# Patient Record
Sex: FEMALE | Race: WHITE | NOT HISPANIC OR LATINO | Employment: FULL TIME | URBAN - METROPOLITAN AREA
[De-identification: names, ages, dates, MRNs, and addresses within clinical notes are randomized per-mention and may not be internally consistent; named-entity substitution may affect disease eponyms.]

---

## 2023-09-14 PROBLEM — H02.831 DERMATOCHALASIS OF RIGHT UPPER EYELID: Status: ACTIVE | Noted: 2023-09-14

## 2023-09-14 PROBLEM — B96.89 ACUTE BACTERIAL SINUSITIS: Status: ACTIVE | Noted: 2023-09-14

## 2023-09-14 PROBLEM — R92.8 ABNORMAL MAMMOGRAM: Status: ACTIVE | Noted: 2023-09-14

## 2023-09-14 PROBLEM — H57.813 BROW PTOSIS, BILATERAL: Status: ACTIVE | Noted: 2023-09-14

## 2023-09-14 PROBLEM — B97.7 HIGH RISK HPV INFECTION: Status: ACTIVE | Noted: 2023-09-14

## 2023-09-14 PROBLEM — M54.9 BACK PAIN: Status: ACTIVE | Noted: 2023-09-14

## 2023-09-14 PROBLEM — H52.4 BILATERAL PRESBYOPIA: Status: ACTIVE | Noted: 2023-09-14

## 2023-09-14 PROBLEM — J30.9 ALLERGIC RHINITIS: Status: ACTIVE | Noted: 2023-09-14

## 2023-09-14 PROBLEM — J01.90 ACUTE BACTERIAL SINUSITIS: Status: ACTIVE | Noted: 2023-09-14

## 2023-09-14 PROBLEM — R87.612 LGSIL OF CERVIX OF UNDETERMINED SIGNIFICANCE: Status: ACTIVE | Noted: 2023-09-14

## 2023-09-14 PROBLEM — R09.82 POST-NASAL DRAINAGE: Status: ACTIVE | Noted: 2023-09-14

## 2023-09-14 PROBLEM — R53.83 FATIGUE: Status: ACTIVE | Noted: 2023-09-14

## 2023-09-14 PROBLEM — H02.834 DERMATOCHALASIS OF LEFT UPPER EYELID: Status: ACTIVE | Noted: 2023-09-14

## 2023-09-14 PROBLEM — H52.03 HYPERMETROPIA OF BOTH EYES: Status: ACTIVE | Noted: 2023-09-14

## 2023-09-14 PROBLEM — M54.30 SCIATICA: Status: ACTIVE | Noted: 2023-09-14

## 2023-09-14 PROBLEM — F43.9 STRESS: Status: ACTIVE | Noted: 2023-09-14

## 2023-09-14 PROBLEM — E55.9 VITAMIN D DEFICIENCY: Status: ACTIVE | Noted: 2023-09-14

## 2023-09-14 PROBLEM — H52.203 ASTIGMATISM, BILATERAL: Status: ACTIVE | Noted: 2023-09-14

## 2023-09-15 ENCOUNTER — OFFICE VISIT (OUTPATIENT)
Dept: PRIMARY CARE | Facility: CLINIC | Age: 58
End: 2023-09-15
Payer: COMMERCIAL

## 2023-09-15 ENCOUNTER — LAB (OUTPATIENT)
Dept: LAB | Facility: LAB | Age: 58
End: 2023-09-15
Payer: COMMERCIAL

## 2023-09-15 VITALS
SYSTOLIC BLOOD PRESSURE: 120 MMHG | OXYGEN SATURATION: 96 % | BODY MASS INDEX: 31.72 KG/M2 | RESPIRATION RATE: 16 BRPM | WEIGHT: 201 LBS | HEART RATE: 75 BPM | DIASTOLIC BLOOD PRESSURE: 70 MMHG | TEMPERATURE: 97.6 F

## 2023-09-15 DIAGNOSIS — Z12.31 ENCOUNTER FOR SCREENING MAMMOGRAM FOR MALIGNANT NEOPLASM OF BREAST: ICD-10-CM

## 2023-09-15 DIAGNOSIS — Z00.00 HEALTH CARE MAINTENANCE: Primary | ICD-10-CM

## 2023-09-15 DIAGNOSIS — Z00.00 HEALTHCARE MAINTENANCE: ICD-10-CM

## 2023-09-15 DIAGNOSIS — Z12.11 COLON CANCER SCREENING: ICD-10-CM

## 2023-09-15 LAB
ALANINE AMINOTRANSFERASE (SGPT) (U/L) IN SER/PLAS: 21 U/L (ref 7–45)
ALBUMIN (G/DL) IN SER/PLAS: 4.6 G/DL (ref 3.4–5)
ALKALINE PHOSPHATASE (U/L) IN SER/PLAS: 83 U/L (ref 33–110)
ANION GAP IN SER/PLAS: 14 MMOL/L (ref 10–20)
ASPARTATE AMINOTRANSFERASE (SGOT) (U/L) IN SER/PLAS: 17 U/L (ref 9–39)
BASOPHILS (10*3/UL) IN BLOOD BY AUTOMATED COUNT: 0.04 X10E9/L (ref 0–0.1)
BASOPHILS/100 LEUKOCYTES IN BLOOD BY AUTOMATED COUNT: 0.6 % (ref 0–2)
BILIRUBIN TOTAL (MG/DL) IN SER/PLAS: 0.6 MG/DL (ref 0–1.2)
CALCIUM (MG/DL) IN SER/PLAS: 9.3 MG/DL (ref 8.6–10.3)
CARBON DIOXIDE, TOTAL (MMOL/L) IN SER/PLAS: 25 MMOL/L (ref 21–32)
CHLORIDE (MMOL/L) IN SER/PLAS: 100 MMOL/L (ref 98–107)
CHOLESTEROL (MG/DL) IN SER/PLAS: 301 MG/DL (ref 0–199)
CHOLESTEROL IN HDL (MG/DL) IN SER/PLAS: 69.8 MG/DL
CHOLESTEROL/HDL RATIO: 4.3
CREATININE (MG/DL) IN SER/PLAS: 0.72 MG/DL (ref 0.5–1.05)
EOSINOPHILS (10*3/UL) IN BLOOD BY AUTOMATED COUNT: 0.24 X10E9/L (ref 0–0.7)
EOSINOPHILS/100 LEUKOCYTES IN BLOOD BY AUTOMATED COUNT: 3.7 % (ref 0–6)
ERYTHROCYTE DISTRIBUTION WIDTH (RATIO) BY AUTOMATED COUNT: 13.6 % (ref 11.5–14.5)
ERYTHROCYTE MEAN CORPUSCULAR HEMOGLOBIN CONCENTRATION (G/DL) BY AUTOMATED: 33.5 G/DL (ref 32–36)
ERYTHROCYTE MEAN CORPUSCULAR VOLUME (FL) BY AUTOMATED COUNT: 89 FL (ref 80–100)
ERYTHROCYTES (10*6/UL) IN BLOOD BY AUTOMATED COUNT: 5.36 X10E12/L (ref 4–5.2)
GFR FEMALE: >90 ML/MIN/1.73M2
GLUCOSE (MG/DL) IN SER/PLAS: 100 MG/DL (ref 74–99)
HEMATOCRIT (%) IN BLOOD BY AUTOMATED COUNT: 47.7 % (ref 36–46)
HEMOGLOBIN (G/DL) IN BLOOD: 16 G/DL (ref 12–16)
IMMATURE GRANULOCYTES/100 LEUKOCYTES IN BLOOD BY AUTOMATED COUNT: 0.2 % (ref 0–0.9)
LDL: 212 MG/DL (ref 0–99)
LEUKOCYTES (10*3/UL) IN BLOOD BY AUTOMATED COUNT: 6.5 X10E9/L (ref 4.4–11.3)
LYMPHOCYTES (10*3/UL) IN BLOOD BY AUTOMATED COUNT: 1.59 X10E9/L (ref 1.2–4.8)
LYMPHOCYTES/100 LEUKOCYTES IN BLOOD BY AUTOMATED COUNT: 24.6 % (ref 13–44)
MONOCYTES (10*3/UL) IN BLOOD BY AUTOMATED COUNT: 0.49 X10E9/L (ref 0.1–1)
MONOCYTES/100 LEUKOCYTES IN BLOOD BY AUTOMATED COUNT: 7.6 % (ref 2–10)
NEUTROPHILS (10*3/UL) IN BLOOD BY AUTOMATED COUNT: 4.1 X10E9/L (ref 1.2–7.7)
NEUTROPHILS/100 LEUKOCYTES IN BLOOD BY AUTOMATED COUNT: 63.3 % (ref 40–80)
PLATELETS (10*3/UL) IN BLOOD AUTOMATED COUNT: 248 X10E9/L (ref 150–450)
POTASSIUM (MMOL/L) IN SER/PLAS: 4.4 MMOL/L (ref 3.5–5.3)
PROTEIN TOTAL: 7.5 G/DL (ref 6.4–8.2)
SODIUM (MMOL/L) IN SER/PLAS: 135 MMOL/L (ref 136–145)
THYROTROPIN (MIU/L) IN SER/PLAS BY DETECTION LIMIT <= 0.05 MIU/L: 1.73 MIU/L (ref 0.44–3.98)
TRIGLYCERIDE (MG/DL) IN SER/PLAS: 97 MG/DL (ref 0–149)
UREA NITROGEN (MG/DL) IN SER/PLAS: 9 MG/DL (ref 6–23)
VLDL: 19 MG/DL (ref 0–40)

## 2023-09-15 PROCEDURE — 85025 COMPLETE CBC W/AUTO DIFF WBC: CPT

## 2023-09-15 PROCEDURE — 84443 ASSAY THYROID STIM HORMONE: CPT

## 2023-09-15 PROCEDURE — 93000 ELECTROCARDIOGRAM COMPLETE: CPT | Performed by: INTERNAL MEDICINE

## 2023-09-15 PROCEDURE — 36415 COLL VENOUS BLD VENIPUNCTURE: CPT

## 2023-09-15 PROCEDURE — 99396 PREV VISIT EST AGE 40-64: CPT | Performed by: INTERNAL MEDICINE

## 2023-09-15 PROCEDURE — 1036F TOBACCO NON-USER: CPT | Performed by: INTERNAL MEDICINE

## 2023-09-15 PROCEDURE — 80061 LIPID PANEL: CPT

## 2023-09-15 PROCEDURE — 80053 COMPREHEN METABOLIC PANEL: CPT

## 2023-09-15 RX ORDER — IBUPROFEN 800 MG/1
800 TABLET ORAL 3 TIMES DAILY
COMMUNITY
Start: 2023-01-27 | End: 2024-01-08

## 2023-09-15 ASSESSMENT — ENCOUNTER SYMPTOMS
DIARRHEA: 0
CONSTIPATION: 0
SLEEP DISTURBANCE: 0
ACTIVITY CHANGE: 0
ABDOMINAL PAIN: 0

## 2023-09-15 ASSESSMENT — PATIENT HEALTH QUESTIONNAIRE - PHQ9
2. FEELING DOWN, DEPRESSED OR HOPELESS: NOT AT ALL
SUM OF ALL RESPONSES TO PHQ9 QUESTIONS 1 AND 2: 0
1. LITTLE INTEREST OR PLEASURE IN DOING THINGS: NOT AT ALL

## 2023-09-15 NOTE — PROGRESS NOTES
"Patient here for a physical     Subjective   Patient ID: Aleta Varela is a 57 y.o. female who presents for Annual Exam.    The patient reports a brief \"whooshing sound\" in her ears after she eats chocolate or coffee.  She has a history of long QT syndrome, though the EKG in the office today was unremarkable.  The patient's mother passed away at the age of 26 from long QT syndrome.    The patient continues to follow with  from Obstetrics/[facility]necology for [facility], and plans to schedule routine pap screening as she is due this year.    The patient reports good sleep quality and energy levels.  The patient denies any abdominal pain or bowel problems.     The patient is a .      Review of Systems   Constitutional:  Negative for activity change.   Gastrointestinal:  Negative for abdominal pain, constipation and diarrhea.   Psychiatric/Behavioral:  Negative for sleep disturbance.      Objective   Physical Exam  Constitutional:       Appearance: Normal appearance.   Neck:      Vascular: No carotid bruit.   Cardiovascular:      Rate and Rhythm: Normal rate and regular rhythm.      Heart sounds: Normal heart sounds.   Pulmonary:      Effort: Pulmonary effort is normal.      Breath sounds: Normal breath sounds.   Abdominal:      General: Bowel sounds are normal.      Palpations: Abdomen is soft.      Tenderness: There is no abdominal tenderness.   Skin:     General: Skin is warm and dry.   Neurological:      General: No focal deficit present.      Mental Status: She is alert and oriented to person, place, and time. Mental status is at baseline.   Psychiatric:         Mood and Affect: Mood normal.         Behavior: Behavior normal.       Assessment/Plan   Problem List Items Addressed This Visit    None  Visit Diagnoses       Health care maintenance    -  Primary    Relevant Orders    ECG 12 lead (Clinic Performed) (Completed)    Healthcare maintenance        Relevant Orders    Lipid panel    " "Comprehensive metabolic panel    CBC and Auto Differential    Tsh With Reflex To Free T4 If Abnormal    Colon cancer screening        Relevant Orders    Colonoscopy Screening    Encounter for screening mammogram for malignant neoplasm of breast        Relevant Orders    BI mammo bilateral screening tomosynthesis            CPE Performed  -  Discussed healthy diet and regular exercise.    -  Physical exam overall unremarkable. Immunizations reviewed and updated accordingly. Healthy lifestyle choices discussed (tobacco avoidance, appropriate alcohol use, avoidance of illicit substances).   -  Patient is wearing seatbelt.   -  Screening lab work ordered as indicated.    -  Age appropriate screening tests reviewed with patient.        EKG unremarkable.    IMPRESSION/PLAN:     /70 in the clinic today.  Advised the patient to call the clinic if \"whooshing sound\" occurs without caffeine trigger, and will consider additional management.     Vitamin D deficiency  - check Vitamin D levels    Diverticulitis  - Resolved. Colonoscopy previously ordered as noted below. Feeling much better.      [facility]  -  Following with  from Obstetrics/Gynecology    Health Maintenance   - Routine labs ordered including CBC, CMP, and a lipid panel to be completed in the fasting state.  Added TSH.  Last Pap 8/2018, patient will schedule repeat for 8/2023..  Last Mammogram 11/2022, ordered repeat for 2023. Re-ordered colonoscopy.     Follow-up according to regular health maintenance in year, call sooner if needed.        Scribe Attestation  By signing my name below, IMarlene Scribe   attest that this documentation has been prepared under the direction and in the presence of Matti An DO.   "

## 2023-09-22 DIAGNOSIS — E78.00 ELEVATED LDL CHOLESTEROL LEVEL: Primary | ICD-10-CM

## 2023-09-22 RX ORDER — ATORVASTATIN CALCIUM 40 MG/1
40 TABLET, FILM COATED ORAL DAILY
Qty: 30 TABLET | Refills: 11 | Status: SHIPPED | OUTPATIENT
Start: 2023-09-22 | End: 2023-10-12 | Stop reason: SDUPTHER

## 2023-10-05 ENCOUNTER — ANCILLARY PROCEDURE (OUTPATIENT)
Dept: RADIOLOGY | Facility: CLINIC | Age: 58
End: 2023-10-05
Payer: COMMERCIAL

## 2023-10-05 DIAGNOSIS — J98.11 ATELECTASIS: ICD-10-CM

## 2023-10-05 DIAGNOSIS — E78.00 ELEVATED LDL CHOLESTEROL LEVEL: ICD-10-CM

## 2023-10-05 DIAGNOSIS — R93.1 ELEVATED CORONARY ARTERY CALCIUM SCORE: Primary | ICD-10-CM

## 2023-10-05 PROCEDURE — 75571 CT HRT W/O DYE W/CA TEST: CPT

## 2023-10-05 NOTE — RESULT ENCOUNTER NOTE
Discussed with patient.  Elevated CAC at 172.  She is doing well on statin.  Will continue.  Given her family history will refer to cardiology.  Will order dedicated repeat CT of the lungs as well.  Suspect atelectasis but will get CT to confirm.

## 2023-10-11 PROBLEM — R93.1 ELEVATED CORONARY ARTERY CALCIUM SCORE: Status: ACTIVE | Noted: 2023-10-11

## 2023-10-11 NOTE — PROGRESS NOTES
Aleta Varela, 1965 was seen in cardiac consultation at the  Cardiology office October 12, 2023.      Patient was interviewed and examined.  Electronic medical records reviewed.    Patient is a pleasant 57-year-old hyperlipidemic woman with past history of prolonged QT syndrome and family history of congenital heart disease who was recently found to have positive elevated calcium score CT score of 172.  We are asked to see her at the request of her primary care physician Dr. An for cardiovascular consultation and going care.    Patient is an active realtor in the area.  She states that she has had known prolonged QT syndrome with syncopal episode 4 years ago requiring evaluation at [facility].  At that time she states that she had no significant testing except for was told that she had long QT syndrome.  She was told to watch her triggers.  She has palpitations occasionally every 3 to 6 months.  She states that the caffeine is usually her precipitator.  She has had no recurrent syncope.  She is not limited by her palpitations.  She has no other significant cardiovascular complaints.    Patient denies Chest Pain, SOB, Lightheadedness, Dizziness, TIA or CVA symptoms.  No CHF or Edema.  No GI,  or Bleeding Issues. No Recent Fever or Chills.    Cardiovascular and general review of systems is otherwise negative.    A 14-system review is otherwise negative, other than noted above.    Allergies, Medications and Vital Signs are as noted below.  NKDA.    Past Medical / Surgical History: Hyperlipidemia probable familial.  Denies hypertension or diabetes.  No prior stroke or known peripheral vascular disease.  No history of coronary artery disease, MI, valvular heart disease or rheumatic fever.  History of prolonged QT syndrome.  No pulmonary history.  History of diverticulitis.  History of endometriosis post single ovarian resection.  Allergic rhinitis.  Vitamin D deficiency.  No other  "significant past medical surgical history.  Otherwise as below.    Social History: .  Her real state agent.  Past smoker.  20-pack-year smoking history.  Occasional social alcohol.  1 cup of coffee per day.  No illicit drug use.  Otherwise as below.    Family History: Family history of mother with valvular heart disease, congenital heart disease, quotations heart upside down and quotations,  26.  Aunts with coronary artery disease a.  Son with lung cancer.  Otherwise as below.    ELECTROCARDIOGRAM: Sinus rhythm.  Nonspecific ST-T wave changes.  QTc 453.    CARDIAC TESTING:    Cardiac CT calcium score: 172, ().  Ascending aorta 34mm.    LABORATORY DATA: Chem-7, CBC, liver function studies, TSH normal except for glucose 100 and sodium 135.  Cholesterol 301, LDL cholesterol 212.  Otherwise as noted below.    All above testing was personally reviewed.    PHYSICAL EXAMINATION:     Vital signs:  /78 (BP Location: Right arm, Patient Position: Sitting, BP Cuff Size: Adult)   Ht 1.695 m (5' 6.75\")   Wt 91.9 kg (202 lb 9.6 oz)   LMP  (Exact Date)   SpO2 98%   BMI 31.97 kg/m²     General: No acute distress. Alert and oriented.  Head And Neck Examination: No jugular venous distention, no carotid bruits, no mass. Carotid upstrokes preserved. Oral mucosa moist.  No xanthelasma. Head and neck examination otherwise unremarkable.  Lungs: Clear to auscultation and percussion. No wheezes, no rales, and no rhonchi.  Chest: Excursion appeared to be normal. No chest wall tenderness on palpation.  Heart: Normal S1 and S2. No S3. No S4. No rub. Grade 1/6 systolic murmur, best heard at the left sternal border. Point of maximal impulse was within normal limits.  Abdomen: Soft. Nontender. No organomegaly. No bruits. No masses.Obese.  Extremities: No bipedal edema. No clubbing. No cyanosis. Pulses are strong throughout. No bruits.  Musculoskeletal Exam: No ulcers, otherwise unremarkable.  Neuro: Neurologically " appeared grossly intact.    IMPRESSION:     Palpitations  Syncopal episode past, no recurrence.  Abnormal rest electrocardiogram  History of long QT syndrome  Elevated calcium score, 172, 9/2023  Hyperlipidemia, probable familial  Elevated glucose  Vitamin D Deficiency  Diverticulosis  Chronic lower back pain  Past tobacco abuse  Family history of congenital heart disease and coronary artery disease   Obesity  Otherwise as per assessment below.    RECOMMENDATIONS:     Patient has above-noted history and findings of.  Here she appears to be doing well overall.  Given her positive calcium score would suggest the following testing: Exercise Myoview perfusion stress test, echocardiogram, 48-hour Holter monitoring.  Further recommendations including possible invasive testing may be warranted.    In terms of her long QT syndrome, she appears to be handling this well we will address as warranted following her monitoring.    We will suggest adding Aspirin 81 mg daily to her regimen.  She will continue her Lipitor 40 mg daily which was just started.  Cholesterol goal of LDL 70 and more ideally closer to 40 given her hyperglycemia and strong family history of CAD.    MyChart portal was encouraged.    We will plan to see back in one month with the above testing, laboratory Studies and ECG as noted below.     Patient will follow up with their primary physician for general care.    The patient knows to contact medical care earlier if need be.      Ventura Norwood MD, [facility] /  Cardiology      Of Note:  [employer] voice recognition dictation software was utilized partially in the preparation of this note, therefore, inaccuracies in spelling, word choice and punctuation may have occurred which were not recognized the time of signing.    `````    Past Medical History:  She has a past medical history of Allergic rhinitis, unspecified (11/13/2020) and Personal history of other specified conditions.    Past Surgical  "History:  She has a past surgical history that includes Other surgical history (04/17/2014).      Social History:  She reports that she has never smoked. She has never used smokeless tobacco. She reports current alcohol use. She reports that she does not use drugs.    Family History:  Family History   Problem Relation Name Age of Onset    Other (cardiac disorder) Mother      Lung cancer Father      Drug abuse Son      Cancer Other          Allergies:  Patient has no known allergies.    Outpatient Medications:  Current Outpatient Medications   Medication Instructions    atorvastatin (LIPITOR) 40 mg, oral, Daily    ibuprofen 800 mg, oral, 3 times daily        Last Recorded Vitals:  Vitals:    10/12/23 0930   BP: 128/78   BP Location: Right arm   Patient Position: Sitting   BP Cuff Size: Adult   SpO2: 98%   Weight: 91.9 kg (202 lb 9.6 oz)   Height: 1.695 m (5' 6.75\")       Last Labs:  CBC -  Lab Results   Component Value Date    WBC 6.5 09/15/2023    HGB 16.0 09/15/2023    HCT 47.7 (H) 09/15/2023    MCV 89 09/15/2023     09/15/2023       CMP -  Lab Results   Component Value Date    CALCIUM 9.3 09/15/2023    PROT 7.5 09/15/2023    ALBUMIN 4.6 09/15/2023    AST 17 09/15/2023    ALT 21 09/15/2023    ALKPHOS 83 09/15/2023    BILITOT 0.6 09/15/2023       LIPID PANEL -   Lab Results   Component Value Date    CHOL 301 (H) 09/15/2023    TRIG 97 09/15/2023    HDL 69.8 09/15/2023    CHHDL 4.3 09/15/2023    LDLF 212 (H) 09/15/2023    VLDL 19 09/15/2023       RENAL FUNCTION PANEL -   Lab Results   Component Value Date    GLUCOSE 100 (H) 09/15/2023     (L) 09/15/2023    K 4.4 09/15/2023     09/15/2023    CO2 25 09/15/2023    ANIONGAP 14 09/15/2023    BUN 9 09/15/2023    CREATININE 0.72 09/15/2023    CALCIUM 9.3 09/15/2023    ALBUMIN 4.6 09/15/2023            Ventura Norwood MD [facility] Cardiology    "

## 2023-10-12 ENCOUNTER — OFFICE VISIT (OUTPATIENT)
Dept: CARDIOLOGY | Facility: CLINIC | Age: 58
End: 2023-10-12
Payer: COMMERCIAL

## 2023-10-12 VITALS
DIASTOLIC BLOOD PRESSURE: 78 MMHG | SYSTOLIC BLOOD PRESSURE: 128 MMHG | WEIGHT: 202.6 LBS | BODY MASS INDEX: 31.8 KG/M2 | HEIGHT: 67 IN | OXYGEN SATURATION: 98 %

## 2023-10-12 DIAGNOSIS — R53.83 FATIGUE, UNSPECIFIED TYPE: ICD-10-CM

## 2023-10-12 DIAGNOSIS — Z87.891 FORMER SMOKER: ICD-10-CM

## 2023-10-12 DIAGNOSIS — E78.00 ELEVATED LDL CHOLESTEROL LEVEL: ICD-10-CM

## 2023-10-12 DIAGNOSIS — M54.50 CHRONIC LOW BACK PAIN, UNSPECIFIED BACK PAIN LATERALITY, UNSPECIFIED WHETHER SCIATICA PRESENT: ICD-10-CM

## 2023-10-12 DIAGNOSIS — E55.9 VITAMIN D DEFICIENCY: Primary | ICD-10-CM

## 2023-10-12 DIAGNOSIS — I45.81 LONG Q-T SYNDROME: ICD-10-CM

## 2023-10-12 DIAGNOSIS — R94.31 ABNORMAL EKG: ICD-10-CM

## 2023-10-12 DIAGNOSIS — K57.92 DIVERTICULITIS: ICD-10-CM

## 2023-10-12 DIAGNOSIS — E78.2 MIXED HYPERLIPIDEMIA: ICD-10-CM

## 2023-10-12 DIAGNOSIS — G89.29 CHRONIC LOW BACK PAIN, UNSPECIFIED BACK PAIN LATERALITY, UNSPECIFIED WHETHER SCIATICA PRESENT: ICD-10-CM

## 2023-10-12 DIAGNOSIS — R93.1 ELEVATED CORONARY ARTERY CALCIUM SCORE: ICD-10-CM

## 2023-10-12 DIAGNOSIS — R73.9 HYPERGLYCEMIA: ICD-10-CM

## 2023-10-12 DIAGNOSIS — Z82.79 FAMILY HISTORY OF CONGENITAL HEART DISEASE: ICD-10-CM

## 2023-10-12 PROCEDURE — 99205 OFFICE O/P NEW HI 60 MIN: CPT | Performed by: INTERNAL MEDICINE

## 2023-10-12 PROCEDURE — 93000 ELECTROCARDIOGRAM COMPLETE: CPT | Performed by: INTERNAL MEDICINE

## 2023-10-12 PROCEDURE — 1036F TOBACCO NON-USER: CPT | Performed by: INTERNAL MEDICINE

## 2023-10-12 RX ORDER — ASPIRIN 81 MG/1
81 TABLET ORAL DAILY
Qty: 90 TABLET | Refills: 3 | Status: SHIPPED | OUTPATIENT
Start: 2023-10-12 | End: 2024-10-11

## 2023-10-12 RX ORDER — ATORVASTATIN CALCIUM 40 MG/1
40 TABLET, FILM COATED ORAL DAILY
Qty: 30 TABLET | Refills: 11 | Status: SHIPPED | OUTPATIENT
Start: 2023-10-12 | End: 2024-10-11

## 2023-10-12 ASSESSMENT — PAIN SCALES - GENERAL: PAINLEVEL: 0-NO PAIN

## 2023-10-17 ENCOUNTER — ANESTHESIA EVENT (OUTPATIENT)
Dept: GASTROENTEROLOGY | Facility: EXTERNAL LOCATION | Age: 58
End: 2023-10-17

## 2023-10-19 ENCOUNTER — APPOINTMENT (OUTPATIENT)
Dept: RADIOLOGY | Facility: CLINIC | Age: 58
End: 2023-10-19
Payer: COMMERCIAL

## 2023-10-19 ENCOUNTER — ANCILLARY PROCEDURE (OUTPATIENT)
Dept: RADIOLOGY | Facility: CLINIC | Age: 58
End: 2023-10-19
Payer: COMMERCIAL

## 2023-10-19 DIAGNOSIS — J98.11 ATELECTASIS: ICD-10-CM

## 2023-10-19 PROCEDURE — 71250 CT THORAX DX C-: CPT

## 2023-10-19 PROCEDURE — 71250 CT THORAX DX C-: CPT | Performed by: RADIOLOGY

## 2023-10-21 DIAGNOSIS — Z87.891 FORMER SMOKER: Primary | ICD-10-CM

## 2023-10-26 ENCOUNTER — ANESTHESIA (OUTPATIENT)
Dept: GASTROENTEROLOGY | Facility: EXTERNAL LOCATION | Age: 58
End: 2023-10-26

## 2023-10-26 ENCOUNTER — APPOINTMENT (OUTPATIENT)
Dept: GASTROENTEROLOGY | Facility: EXTERNAL LOCATION | Age: 58
End: 2023-10-26
Payer: COMMERCIAL

## 2023-10-27 ENCOUNTER — HOSPITAL ENCOUNTER (EMERGENCY)
Facility: HOSPITAL | Age: 58
Discharge: HOME | End: 2023-10-27
Attending: EMERGENCY MEDICINE
Payer: COMMERCIAL

## 2023-10-27 VITALS
WEIGHT: 200.62 LBS | SYSTOLIC BLOOD PRESSURE: 167 MMHG | DIASTOLIC BLOOD PRESSURE: 74 MMHG | BODY MASS INDEX: 31.66 KG/M2 | TEMPERATURE: 97.5 F | OXYGEN SATURATION: 97 % | HEART RATE: 86 BPM | RESPIRATION RATE: 18 BRPM

## 2023-10-27 DIAGNOSIS — J01.90 ACUTE NON-RECURRENT SINUSITIS, UNSPECIFIED LOCATION: Primary | ICD-10-CM

## 2023-10-27 PROCEDURE — 99284 EMERGENCY DEPT VISIT MOD MDM: CPT | Performed by: EMERGENCY MEDICINE

## 2023-10-27 PROCEDURE — 99283 EMERGENCY DEPT VISIT LOW MDM: CPT | Performed by: EMERGENCY MEDICINE

## 2023-10-27 RX ORDER — AMOXICILLIN AND CLAVULANATE POTASSIUM 875; 125 MG/1; MG/1
1 TABLET, FILM COATED ORAL EVERY 12 HOURS
Qty: 14 TABLET | Refills: 0 | Status: SHIPPED | OUTPATIENT
Start: 2023-10-27 | End: 2023-11-03

## 2023-10-27 ASSESSMENT — PAIN DESCRIPTION - PAIN TYPE: TYPE: ACUTE PAIN

## 2023-10-27 ASSESSMENT — PAIN DESCRIPTION - PROGRESSION: CLINICAL_PROGRESSION: NOT CHANGED

## 2023-10-27 ASSESSMENT — PAIN SCALES - GENERAL: PAINLEVEL_OUTOF10: 6

## 2023-10-27 ASSESSMENT — LIFESTYLE VARIABLES
REASON UNABLE TO ASSESS: NO
HAVE PEOPLE ANNOYED YOU BY CRITICIZING YOUR DRINKING: NO
EVER HAD A DRINK FIRST THING IN THE MORNING TO STEADY YOUR NERVES TO GET RID OF A HANGOVER: NO
EVER FELT BAD OR GUILTY ABOUT YOUR DRINKING: NO
HAVE YOU EVER FELT YOU SHOULD CUT DOWN ON YOUR DRINKING: NO

## 2023-10-27 ASSESSMENT — PAIN DESCRIPTION - ONSET: ONSET: ONGOING

## 2023-10-27 ASSESSMENT — PAIN - FUNCTIONAL ASSESSMENT: PAIN_FUNCTIONAL_ASSESSMENT: 0-10

## 2023-10-27 ASSESSMENT — PAIN DESCRIPTION - LOCATION: LOCATION: THROAT

## 2023-10-27 ASSESSMENT — COLUMBIA-SUICIDE SEVERITY RATING SCALE - C-SSRS
6. HAVE YOU EVER DONE ANYTHING, STARTED TO DO ANYTHING, OR PREPARED TO DO ANYTHING TO END YOUR LIFE?: NO
2. HAVE YOU ACTUALLY HAD ANY THOUGHTS OF KILLING YOURSELF?: NO
1. IN THE PAST MONTH, HAVE YOU WISHED YOU WERE DEAD OR WISHED YOU COULD GO TO SLEEP AND NOT WAKE UP?: NO

## 2023-10-27 ASSESSMENT — PAIN DESCRIPTION - DESCRIPTORS: DESCRIPTORS: BURNING;SHARP

## 2023-10-27 ASSESSMENT — PAIN DESCRIPTION - FREQUENCY: FREQUENCY: CONSTANT/CONTINUOUS

## 2023-10-27 NOTE — ED TRIAGE NOTES
Pt states sinus pressure, cough started 10 days ago. Pt states sore throat started four days ago. Pt states nauseated with vomiting. Pt states fever started Tuesday.

## 2023-10-27 NOTE — ED PROVIDER NOTES
"EMERGENCY DEPARTMENT ENCOUNTER      Pt Name: Aleta Varela  MRN: [id number]  Birthdate 1965  Date of evaluation: 10/27/2023  Provider: Duarte Freeman DO    CHIEF COMPLAINT       Chief Complaint   Patient presents with    Sinusitis    Sore Throat    Cough    Vomiting         HISTORY OF PRESENT ILLNESS    57-year-old female with hyperlipidemia comes to the emergency room today for URI symptoms.  Onset was 10 days ago with cough, congestion, rhinorrhea.  Over the last few days she developed a fever, increased phlegm production, sore throat as well.  The fever subjective at home she is felt warm has not checked her temperature but has not swabbed result for COVID or flu.  No chest pain or shortness of breath.  She says she was having \"shortness of breath\" which was actually more feeling like her throat was congested as opposed to shortness of breath from her lungs.  No abdominal pain, nausea, vomiting or frequency, urgency, hematuria, dysuria.  No black or red stools.  No other symptoms today.  She does not smoke anymore, no regular drug or alcohol use.      History provided by:  Patient      Nursing Notes were reviewed.    PAST MEDICAL HISTORY     Past Medical History:   Diagnosis Date    Allergic rhinitis, unspecified 11/13/2020    Allergic rhinitis    Personal history of other specified conditions     History of prolonged Q-T interval on ECG         SURGICAL HISTORY       Past Surgical History:   Procedure Laterality Date    OTHER SURGICAL HISTORY  04/17/2014    Ovarian Cystectomy         CURRENT MEDICATIONS       Previous Medications    ASPIRIN 81 MG EC TABLET    Take 1 tablet (81 mg) by mouth once daily.    ATORVASTATIN (LIPITOR) 40 MG TABLET    Take 1 tablet (40 mg) by mouth once daily.    IBUPROFEN 800 MG TABLET    Take 1 tablet (800 mg) by mouth 3 times a day.       ALLERGIES     Patient has no known allergies.    FAMILY HISTORY       Family History   Problem Relation Name Age of Onset    Other (cardiac " disorder) Mother      Lung cancer Father      Drug abuse Son      Cancer Other            SOCIAL HISTORY       Social History     Socioeconomic History    Marital status:      Spouse name: None    Number of children: None    Years of education: None    Highest education level: None   Occupational History     Employer: [employer]   Tobacco Use    Smoking status: Never    Smokeless tobacco: Never   Vaping Use    Vaping Use: Never used   Substance and Sexual Activity    Alcohol use: Yes    Drug use: Never    Sexual activity: None   Other Topics Concern    None   Social History Narrative    None     Social Determinants of Health     Financial Resource Strain: Not on file   Food Insecurity: Not on file   Transportation Needs: Not on file   Physical Activity: Not on file   Stress: Not on file   Social Connections: Not on file   Intimate Partner Violence: Not on file   Housing Stability: Not on file       SCREENINGS                        PHYSICAL EXAM    (up to 7 for level 4, 8 or more for level 5)     ED Triage Vitals [10/27/23 0732]   Temp Heart Rate Resp BP   36.4 °C (97.5 °F) 83 18 171/77      SpO2 Temp Source Heart Rate Source Patient Position   97 % Temporal Monitor Sitting      BP Location FiO2 (%)     Right arm --       Physical Exam  Vitals and nursing note reviewed.   Constitutional:       General: She is not in acute distress.  HENT:      Head: Normocephalic and atraumatic.   Eyes:      General: No scleral icterus.        Right eye: No discharge.         Left eye: No discharge.      Conjunctiva/sclera: Conjunctivae normal.   Cardiovascular:      Rate and Rhythm: Normal rate and regular rhythm.      Pulses: Normal pulses.   Pulmonary:      Effort: Pulmonary effort is normal.   Abdominal:      General: Abdomen is flat.      Palpations: Abdomen is soft.      Tenderness: There is no abdominal tenderness. There is no guarding or rebound.   Musculoskeletal:         General: No deformity.      Right lower  leg: No edema.      Left lower leg: No edema.   Skin:     General: Skin is warm and dry.   Neurological:      Mental Status: She is alert and oriented to person, place, and time. Mental status is at baseline.   Psychiatric:         Mood and Affect: Mood normal.         Behavior: Behavior normal.          DIAGNOSTIC RESULTS     LABS:  Labs Reviewed - No data to display    All other labs were within normal range or not returned as of this dictation.    Imaging  No orders to display        Procedures  Procedures     EMERGENCY DEPARTMENT COURSE/MDM:     Diagnoses as of 10/27/23 0800   Acute non-recurrent sinusitis, unspecified location        Medical Decision Making  57-year-old female comes to the emergency room with URI symptoms.  She does fit the profile for sinus infection with symptoms lasting about 10 days, fevers developing later in the symptom course.  Patient ENT exam is unremarkable except for some tonsillar area FEMA.  No exudate.  No facial asymmetry, abscess formation inside the mouth.  No chest pain or shortness of breath, normal vital signs at this time.  We did offer her basic blood work, chest x-ray, however she declined and was okay with being discharged home with a prescription for Augmentin.  She will follow-up with her PCP, return for new, concerning or worsening symptoms.        Patient and or family in agreement and understanding of treatment plan.  All questions answered.      I reviewed the case with the attending ED physician. The attending ED physician agrees with the plan. Patient and/or patient´s representative was counseled regarding labs, imaging, likely diagnosis, and plan. All questions were answered.    ED Medications administered this visit:  Medications - No data to display    New Prescriptions from this visit:    New Prescriptions    No medications on file       Follow-up:  Matti An, DO  [address]  [facility]  [facility]  [address]  [phone]              Final Impression:   1. Acute non-recurrent sinusitis, unspecified location          (Please note that portions of this note were completed with a voice recognition program.  Efforts were made to edit the dictations but occasionally words are mis-transcribed.)     Duarte Freeman DO  Resident  10/27/23 0800

## 2023-11-01 ENCOUNTER — OFFICE VISIT (OUTPATIENT)
Dept: OBSTETRICS AND GYNECOLOGY | Facility: CLINIC | Age: 58
End: 2023-11-01
Payer: COMMERCIAL

## 2023-11-08 ENCOUNTER — APPOINTMENT (OUTPATIENT)
Dept: RADIOLOGY | Facility: CLINIC | Age: 58
End: 2023-11-08
Payer: COMMERCIAL

## 2023-11-09 ENCOUNTER — APPOINTMENT (OUTPATIENT)
Dept: CARDIOLOGY | Facility: CLINIC | Age: 58
End: 2023-11-09
Payer: COMMERCIAL

## 2023-11-10 ENCOUNTER — APPOINTMENT (OUTPATIENT)
Dept: RADIOLOGY | Facility: CLINIC | Age: 58
End: 2023-11-10
Payer: COMMERCIAL

## 2023-11-10 ENCOUNTER — APPOINTMENT (OUTPATIENT)
Dept: CARDIOLOGY | Facility: CLINIC | Age: 58
End: 2023-11-10
Payer: COMMERCIAL

## 2023-11-13 ENCOUNTER — APPOINTMENT (OUTPATIENT)
Dept: RADIOLOGY | Facility: CLINIC | Age: 58
End: 2023-11-13
Payer: COMMERCIAL

## 2023-11-13 ENCOUNTER — ANCILLARY PROCEDURE (OUTPATIENT)
Dept: RADIOLOGY | Facility: CLINIC | Age: 58
End: 2023-11-13
Payer: COMMERCIAL

## 2023-11-13 DIAGNOSIS — Z12.31 ENCOUNTER FOR SCREENING MAMMOGRAM FOR MALIGNANT NEOPLASM OF BREAST: ICD-10-CM

## 2023-11-13 PROCEDURE — 77063 BREAST TOMOSYNTHESIS BI: CPT

## 2023-11-13 PROCEDURE — 77067 SCR MAMMO BI INCL CAD: CPT | Mod: BILATERAL PROCEDURE | Performed by: RADIOLOGY

## 2023-11-13 PROCEDURE — 77063 BREAST TOMOSYNTHESIS BI: CPT | Mod: BILATERAL PROCEDURE | Performed by: RADIOLOGY

## 2023-11-15 ENCOUNTER — APPOINTMENT (OUTPATIENT)
Dept: RADIOLOGY | Facility: CLINIC | Age: 58
End: 2023-11-15
Payer: COMMERCIAL

## 2023-11-16 ENCOUNTER — APPOINTMENT (OUTPATIENT)
Dept: GASTROENTEROLOGY | Facility: EXTERNAL LOCATION | Age: 58
End: 2023-11-16
Payer: COMMERCIAL

## 2023-11-16 ENCOUNTER — APPOINTMENT (OUTPATIENT)
Dept: CARDIOLOGY | Facility: CLINIC | Age: 58
End: 2023-11-16
Payer: COMMERCIAL

## 2023-12-07 ENCOUNTER — OFFICE VISIT (OUTPATIENT)
Dept: PRIMARY CARE | Facility: CLINIC | Age: 58
End: 2023-12-07
Payer: COMMERCIAL

## 2023-12-07 ENCOUNTER — APPOINTMENT (OUTPATIENT)
Dept: GASTROENTEROLOGY | Facility: EXTERNAL LOCATION | Age: 58
End: 2023-12-07
Payer: COMMERCIAL

## 2023-12-07 VITALS
OXYGEN SATURATION: 98 % | TEMPERATURE: 97.4 F | WEIGHT: 197 LBS | DIASTOLIC BLOOD PRESSURE: 85 MMHG | HEART RATE: 85 BPM | SYSTOLIC BLOOD PRESSURE: 153 MMHG | BODY MASS INDEX: 31.09 KG/M2 | RESPIRATION RATE: 16 BRPM

## 2023-12-07 DIAGNOSIS — R05.9 COUGH, UNSPECIFIED TYPE: Primary | ICD-10-CM

## 2023-12-07 PROCEDURE — 99214 OFFICE O/P EST MOD 30 MIN: CPT | Performed by: INTERNAL MEDICINE

## 2023-12-07 PROCEDURE — 1036F TOBACCO NON-USER: CPT | Performed by: INTERNAL MEDICINE

## 2023-12-07 RX ORDER — ASCORBIC ACID 1000 MG
175 TABLET ORAL DAILY
COMMUNITY

## 2023-12-07 RX ORDER — CHOLECALCIFEROL (VITAMIN D3) 50 MCG
50 TABLET ORAL DAILY
COMMUNITY

## 2023-12-07 RX ORDER — METHYLPREDNISOLONE 4 MG/1
TABLET ORAL
Qty: 21 TABLET | Refills: 0 | Status: SHIPPED | OUTPATIENT
Start: 2023-12-07 | End: 2023-12-14

## 2023-12-07 RX ORDER — BENZONATATE 100 MG/1
100 CAPSULE ORAL 3 TIMES DAILY PRN
Qty: 42 CAPSULE | Refills: 1 | Status: SHIPPED | OUTPATIENT
Start: 2023-12-07 | End: 2024-01-06

## 2023-12-07 ASSESSMENT — PATIENT HEALTH QUESTIONNAIRE - PHQ9
1. LITTLE INTEREST OR PLEASURE IN DOING THINGS: NOT AT ALL
2. FEELING DOWN, DEPRESSED OR HOPELESS: NOT AT ALL
SUM OF ALL RESPONSES TO PHQ9 QUESTIONS 1 AND 2: 0

## 2023-12-07 ASSESSMENT — ENCOUNTER SYMPTOMS
CONSTIPATION: 1
COUGH: 1
SHORTNESS OF BREATH: 0
BLOOD IN STOOL: 0

## 2023-12-07 NOTE — PROGRESS NOTES
Patient here for a GI issue / cough and fatigue     Subjective   Patient ID: Aleta Varela is a 57 y.o. female who presents for GI Problem and Cough.    The patient reports occasional constipation that was recently relieved with Dulcolax.  She notes mild abdominal tenderness at a site treated by cool sculpting.  She denies any hematochezia or melena.    The patient notes ongoing dry cough since 10/2023 despite attending the ER for acute sinusitis at the time and completing Augmentin.  She has noticed that the symptoms improve with a hot shower, but denies any dyspnea or nasal congestion.  The patient is unsure whether she has taken prednisone in the past.  She had several coworkers with similar symptoms.      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    GI Problem    The illness is also significant for constipation.   Cough  Pertinent negatives include no shortness of breath.       Review of Systems   HENT:  Negative for congestion.    Respiratory:  Positive for cough. Negative for shortness of breath.    Gastrointestinal:  Positive for constipation. Negative for blood in stool.       Objective   Physical Exam  Constitutional:       Appearance: Normal appearance.   Neck:      Vascular: No carotid bruit.   Cardiovascular:      Rate and Rhythm: Normal rate and regular rhythm.      Heart sounds: Normal heart sounds.   Pulmonary:      Effort: Pulmonary effort is normal.      Breath sounds: Normal breath sounds.   Abdominal:      General: Bowel sounds are normal.      Palpations: Abdomen is soft.      Tenderness: There is no abdominal tenderness.   Skin:     General: Skin is warm and dry.   Neurological:      General: No focal deficit present.      Mental Status: She is alert and oriented to person, place, and time. Mental status is at baseline.   Psychiatric:         Mood and Affect: Mood normal.         Behavior: Behavior normal.         Assessment/Plan   Problem List Items Addressed This  Visit    None  Visit Diagnoses         Codes    Cough, unspecified type    -  Primary R05.9    Relevant Medications    methylPREDNISolone (Medrol Dospak) 4 mg tablets    benzonatate (Tessalon) 100 mg capsule            IMPRESSION/PLAN:     Recurrent Constipation  - Advised patient to try Miralax at first, and then continue with Metamucil.    Cough  - Likely recovering from previous RSV.  Prescribed benzonatate (Tessalon) 100 mg up to TID prn and methylprednisolone 4mg Dospak to be taken as directed on the packaging.  Call the clinic if symptoms persist or worsen.     HLD  - Cholesterol and LDL high per 9/2023.  Continue with atorvastatin 40mg every day and ASA 81mg once daily.  ASCVD 3.8% 12/2023.  CT Calcium Score 172 per 10/2023.  Following with  from Cardiology.    Diverticulitis     -Colonoscopy previously ordered as    noted below. Feeling much better.      [facility]  -  Following with  from Obstetrics/Gynecology     Vitamin D deficiency  - check Vitamin D levels    Health Maintenance   - Routine labs 9/2023.  Last Pap 8/2018.  Last Mammogram 11/2023. Re-ordered colonoscopy.     Follow-up according to regular health maintenance in year, call sooner if needed.        Scribe Attestation  By signing my name below, I, Marlene Mccann, Danie   attest that this documentation has been prepared under the direction and in the presence of Matti An DO.     Marlene Mccann 12/07/23 9:59 AM

## 2023-12-08 ENCOUNTER — OFFICE VISIT (OUTPATIENT)
Dept: OBSTETRICS AND GYNECOLOGY | Facility: CLINIC | Age: 58
End: 2023-12-08
Payer: COMMERCIAL

## 2023-12-08 VITALS
WEIGHT: 195 LBS | BODY MASS INDEX: 29.55 KG/M2 | SYSTOLIC BLOOD PRESSURE: 120 MMHG | HEIGHT: 68 IN | DIASTOLIC BLOOD PRESSURE: 78 MMHG

## 2023-12-08 DIAGNOSIS — N95.2 VAGINAL ATROPHY: ICD-10-CM

## 2023-12-08 DIAGNOSIS — Z12.4 CERVICAL CANCER SCREENING: ICD-10-CM

## 2023-12-08 DIAGNOSIS — Z01.419 WELL WOMAN EXAM WITH ROUTINE GYNECOLOGICAL EXAM: Primary | ICD-10-CM

## 2023-12-08 PROCEDURE — 99386 PREV VISIT NEW AGE 40-64: CPT

## 2023-12-08 PROCEDURE — 1036F TOBACCO NON-USER: CPT

## 2023-12-08 PROCEDURE — 87624 HPV HI-RISK TYP POOLED RSLT: CPT

## 2023-12-08 PROCEDURE — 88175 CYTOPATH C/V AUTO FLUID REDO: CPT

## 2023-12-08 RX ORDER — ESTRADIOL 0.1 MG/G
2 CREAM VAGINAL NIGHTLY
Qty: 42.5 G | Refills: 6 | Status: SHIPPED | OUTPATIENT
Start: 2023-12-08 | End: 2024-12-07

## 2023-12-08 ASSESSMENT — PAIN SCALES - GENERAL: PAINLEVEL: 0-NO PAIN

## 2023-12-08 NOTE — PROGRESS NOTES
"Assessment/Plan   Diagnoses and all orders for this visit:  Well woman exam with routine gynecological exam  Vaginal atrophy  -     estradiol (Estrace) 0.01 % (0.1 mg/gram) vaginal cream; Insert 0.5 Applicatorfuls (2 g) into the vagina once daily at bedtime. At bedtime for 2 weeks, then at bedtime twice a week.  Cervical cancer screening  -     THINPREP PAP TEST      Stephanie ZAVALA Juancarlos, APRN-CNM     Subjective   Aleta Varela is a 57 y.o. female who is here for a routine exam.     Concerns today:  None today     No LMP recorded. Patient is postmenopausal.    Denies hot flashes, occasional night sweats. Denies mood swings. Occasional trouble sleeping.     Sexual Activity: sexually active, male partners; Patient reports 1 partners in the last 12 months.  Pain with intercourse? Yes  and very occasional   Loss of desire? Yes   Able to have an orgasm? Yes     History of prior STI:  many years ago       Last pap: 2018  History of abnormal Pap smear: yes - many years ago  Family history of uterine or ovarian cancer: no    Last mammogram:   History of abnormal mammogram: yes - follow up has always been neg   Family history of breast cancer: no  Menstrual History:  OB History          3    Para   2    Term   2            AB   1    Living   2         SAB        IAB        Ectopic        Multiple        Live Births   2                  No LMP recorded. Patient is postmenopausal.       Objective   /78   Ht 1.727 m (5' 8\")   Wt 88.5 kg (195 lb)   BMI 29.65 kg/m²   Physical Exam  Constitutional:       General: She is not in acute distress.     Appearance: Normal appearance. She is normal weight. She is not ill-appearing.   Genitourinary:      Vulva, bladder, rectum and urethral meatus normal.      No lesions in the vagina.      Right Labia: No rash, tenderness, lesions, skin changes or Bartholin's cyst.     Left Labia: No tenderness, lesions, skin changes, Bartholin's cyst or rash.     No labial fusion " noted.      No vaginal discharge, erythema, tenderness, bleeding or ulceration.      Mild vaginal atrophy present.       Right Adnexa: not tender and no mass present.     Left Adnexa: not tender and no mass present.     No cervical discharge, lesion or polyp.      Uterus is not enlarged, tender, irregular or prolapsed.      No uterine mass detected.     No urethral prolapse, mass or discharge present.      Pelvic exam was performed with patient in the lithotomy position.   Breasts:     Breasts are symmetrical.      Breasts are soft.     Right: Normal.      Left: Normal.   HENT:      Head: Normocephalic.      Right Ear: External ear normal.      Left Ear: External ear normal.      Nose: Nose normal.      Mouth/Throat:      Mouth: Mucous membranes are moist.      Pharynx: Oropharynx is clear.   Eyes:      General:         Right eye: No discharge.         Left eye: No discharge.      Pupils: Pupils are equal, round, and reactive to light.   Pulmonary:      Effort: Pulmonary effort is normal. No respiratory distress.   Abdominal:      General: Abdomen is flat.      Palpations: Abdomen is soft.      Tenderness: There is no abdominal tenderness.   Musculoskeletal:         General: No swelling, tenderness, deformity or signs of injury. Normal range of motion.      Cervical back: Normal range of motion. No rigidity or tenderness.   Neurological:      General: No focal deficit present.      Mental Status: She is alert and oriented to person, place, and time. Mental status is at baseline.      Sensory: No sensory deficit.      Motor: No weakness.   Skin:     General: Skin is warm and dry.      Coloration: Skin is not jaundiced.      Findings: No bruising, erythema, lesion or rash.   Psychiatric:         Mood and Affect: Mood normal.         Behavior: Behavior normal.         Thought Content: Thought content normal.         Judgment: Judgment normal.

## 2024-01-02 LAB
CYTOLOGY CMNT CVX/VAG CYTO-IMP: NORMAL
HPV HR 12 DNA GENITAL QL NAA+PROBE: NEGATIVE
HPV HR GENOTYPES PNL CVX NAA+PROBE: NEGATIVE
HPV16 DNA SPEC QL NAA+PROBE: NEGATIVE
HPV18 DNA SPEC QL NAA+PROBE: NEGATIVE
LAB AP HPV GENOTYPE QUESTION: YES
LAB AP HPV HR: NORMAL
LABORATORY COMMENT REPORT: NORMAL
PATH REPORT.TOTAL CANCER: NORMAL

## 2024-01-08 DIAGNOSIS — M54.9 BACK PAIN, UNSPECIFIED BACK LOCATION, UNSPECIFIED BACK PAIN LATERALITY, UNSPECIFIED CHRONICITY: Primary | ICD-10-CM

## 2024-01-08 RX ORDER — IBUPROFEN 800 MG/1
TABLET ORAL
Qty: 90 TABLET | Refills: 0 | Status: SHIPPED | OUTPATIENT
Start: 2024-01-08 | End: 2024-05-18 | Stop reason: SDUPTHER

## 2024-02-05 DIAGNOSIS — B37.9 YEAST INFECTION: Primary | ICD-10-CM

## 2024-02-05 RX ORDER — FLUCONAZOLE 150 MG/1
150 TABLET ORAL ONCE
Qty: 1 TABLET | Refills: 0 | Status: SHIPPED | OUTPATIENT
Start: 2024-02-05 | End: 2024-02-05

## 2024-02-07 ENCOUNTER — LAB (OUTPATIENT)
Dept: LAB | Facility: LAB | Age: 59
End: 2024-02-07
Payer: COMMERCIAL

## 2024-02-07 DIAGNOSIS — R30.0 DYSURIA: ICD-10-CM

## 2024-02-07 DIAGNOSIS — E78.00 ELEVATED LDL CHOLESTEROL LEVEL: ICD-10-CM

## 2024-02-07 DIAGNOSIS — R30.0 DYSURIA: Primary | ICD-10-CM

## 2024-02-07 LAB
ALBUMIN SERPL BCP-MCNC: 4.2 G/DL (ref 3.4–5)
ALP SERPL-CCNC: 92 U/L (ref 33–110)
ALT SERPL W P-5'-P-CCNC: 13 U/L (ref 7–45)
APPEARANCE UR: ABNORMAL
AST SERPL W P-5'-P-CCNC: 12 U/L (ref 9–39)
BACTERIA #/AREA URNS AUTO: ABNORMAL /HPF
BILIRUB DIRECT SERPL-MCNC: 0.1 MG/DL (ref 0–0.3)
BILIRUB SERPL-MCNC: 0.5 MG/DL (ref 0–1.2)
BILIRUB UR STRIP.AUTO-MCNC: NEGATIVE MG/DL
CHOLEST SERPL-MCNC: 192 MG/DL (ref 0–199)
CHOLESTEROL/HDL RATIO: 2.8
COLOR UR: ABNORMAL
GLUCOSE UR STRIP.AUTO-MCNC: NEGATIVE MG/DL
HDLC SERPL-MCNC: 68.9 MG/DL
KETONES UR STRIP.AUTO-MCNC: NEGATIVE MG/DL
LDLC SERPL CALC-MCNC: 100 MG/DL
LEUKOCYTE ESTERASE UR QL STRIP.AUTO: ABNORMAL
MUCOUS THREADS #/AREA URNS AUTO: ABNORMAL /LPF
NITRITE UR QL STRIP.AUTO: NEGATIVE
NON HDL CHOLESTEROL: 123 MG/DL (ref 0–149)
PH UR STRIP.AUTO: 6 [PH]
PROT SERPL-MCNC: 6.5 G/DL (ref 6.4–8.2)
PROT UR STRIP.AUTO-MCNC: ABNORMAL MG/DL
RBC # UR STRIP.AUTO: ABNORMAL /UL
RBC #/AREA URNS AUTO: >20 /HPF
SP GR UR STRIP.AUTO: 1.02
SQUAMOUS #/AREA URNS AUTO: ABNORMAL /HPF
TRIGL SERPL-MCNC: 116 MG/DL (ref 0–149)
UROBILINOGEN UR STRIP.AUTO-MCNC: <2 MG/DL
VLDL: 23 MG/DL (ref 0–40)
WBC #/AREA URNS AUTO: >50 /HPF
WBC CLUMPS #/AREA URNS AUTO: ABNORMAL /HPF

## 2024-02-07 PROCEDURE — 81001 URINALYSIS AUTO W/SCOPE: CPT

## 2024-02-07 PROCEDURE — 36415 COLL VENOUS BLD VENIPUNCTURE: CPT

## 2024-02-07 PROCEDURE — 80061 LIPID PANEL: CPT

## 2024-02-07 PROCEDURE — 87086 URINE CULTURE/COLONY COUNT: CPT

## 2024-02-07 PROCEDURE — 80076 HEPATIC FUNCTION PANEL: CPT

## 2024-02-07 ASSESSMENT — ENCOUNTER SYMPTOMS
SORE THROAT: 0
NAUSEA: 0
HEMATURIA: 1
DYSURIA: 1
FREQUENCY: 1
FLANK PAIN: 1

## 2024-02-08 ENCOUNTER — OFFICE VISIT (OUTPATIENT)
Dept: PRIMARY CARE | Facility: CLINIC | Age: 59
End: 2024-02-08
Payer: COMMERCIAL

## 2024-02-08 VITALS
BODY MASS INDEX: 30.04 KG/M2 | SYSTOLIC BLOOD PRESSURE: 126 MMHG | OXYGEN SATURATION: 97 % | HEIGHT: 68 IN | DIASTOLIC BLOOD PRESSURE: 70 MMHG | WEIGHT: 198.2 LBS | HEART RATE: 74 BPM

## 2024-02-08 DIAGNOSIS — N39.0 URINARY TRACT INFECTION WITHOUT HEMATURIA, SITE UNSPECIFIED: Primary | ICD-10-CM

## 2024-02-08 DIAGNOSIS — R39.9 UTI SYMPTOMS: ICD-10-CM

## 2024-02-08 LAB
BACTERIA UR CULT: NORMAL
POC APPEARANCE, URINE: CLEAR
POC BILIRUBIN, URINE: NEGATIVE
POC BLOOD, URINE: ABNORMAL
POC COLOR, URINE: ABNORMAL
POC GLUCOSE, URINE: ABNORMAL MG/DL
POC KETONES, URINE: NEGATIVE MG/DL
POC LEUKOCYTES, URINE: ABNORMAL
POC NITRITE,URINE: POSITIVE
POC PH, URINE: 6.5 PH
POC PROTEIN, URINE: ABNORMAL MG/DL
POC SPECIFIC GRAVITY, URINE: 1.01
POC UROBILINOGEN, URINE: 0.2 EU/DL

## 2024-02-08 PROCEDURE — 1036F TOBACCO NON-USER: CPT | Performed by: INTERNAL MEDICINE

## 2024-02-08 PROCEDURE — 99214 OFFICE O/P EST MOD 30 MIN: CPT | Performed by: INTERNAL MEDICINE

## 2024-02-08 PROCEDURE — 81002 URINALYSIS NONAUTO W/O SCOPE: CPT | Performed by: INTERNAL MEDICINE

## 2024-02-08 RX ORDER — NITROFURANTOIN 25; 75 MG/1; MG/1
100 CAPSULE ORAL 2 TIMES DAILY
Qty: 10 CAPSULE | Refills: 0 | Status: SHIPPED | OUTPATIENT
Start: 2024-02-08 | End: 2024-02-13

## 2024-02-08 NOTE — PROGRESS NOTES
Subjective   Patient ID: [id number]     Aleta Varela is a 58 y.o. female who presents for UTI (Onset about 7 days ago. Admits to urgency, frequency, flank pain, dysuria, discolored urine. Patint states it is worsening. ).    Current Outpatient Medications:     ascorbic acid (VITAMIN C ORAL), Take by mouth., Disp: , Rfl:     aspirin 81 mg EC tablet, Take 1 tablet (81 mg) by mouth once daily., Disp: 90 tablet, Rfl: 3    atorvastatin (Lipitor) 40 mg tablet, Take 1 tablet (40 mg) by mouth once daily., Disp: 30 tablet, Rfl: 11    BIOTIN ORAL, Take by mouth., Disp: , Rfl:     cholecalciferol (Vitamin D3) 50 MCG (2000 UT) tablet, Take 1 tablet (50 mcg) by mouth once daily., Disp: , Rfl:     estradiol (Estrace) 0.01 % (0.1 mg/gram) vaginal cream, Insert 0.5 Applicatorfuls (2 g) into the vagina once daily at bedtime. At bedtime for 2 weeks, then at bedtime twice a week., Disp: 42.5 g, Rfl: 6     mg tablet, TAKE ONE TABLET BY MOUTH THREE TIMES A DAY AS NEEDED FOR SCIATICA, Disp: 90 tablet, Rfl: 0    milk thistle 175 mg tablet, Take 1 tablet (175 mg) by mouth once daily., Disp: , Rfl:     nitrofurantoin, macrocrystal-monohydrate, (Macrobid) 100 mg capsule, Take 1 capsule (100 mg) by mouth 2 times a day for 5 days., Disp: 10 capsule, Rfl: 0  HPI  58-year-old patient presented to the office today to discuss concerns regarding ongoing urinary symptoms.  Patient stated that she was treated for sinus infection and pneumonia within the last 2 months and then she started experiencing some irritation in the vulvar area as she took 1 tablet of Diflucan and then she started noticing increased burning sensation and frequency and urgency with urination and lower pelvic discomfort.  No fever chills or rigors no abdominal pain no other symptoms.  She denies any vaginal discharge or itching.  Review of system was reviewed all normal except what is noted in HPI   Past Medical History:   Diagnosis Date    Allergic rhinitis,  "unspecified 11/13/2020    Allergic rhinitis    Personal history of other specified conditions     History of prolonged Q-T interval on ECG      Objective   /70 (BP Location: Left arm, Patient Position: Sitting)   Pulse 74   Ht 1.727 m (5' 8\")   Wt 89.9 kg (198 lb 3.2 oz)   SpO2 97%   BMI 30.14 kg/m²      Physical Exam  Constitutional:       Appearance: Normal appearance.   Cardiovascular:      Rate and Rhythm: Normal rate and regular rhythm.      Pulses: Normal pulses.      Heart sounds: Normal heart sounds.   Pulmonary:      Effort: Pulmonary effort is normal.      Breath sounds: Normal breath sounds.   Neurological:      Mental Status: She is alert.         Assessment/Plan   Problem List Items Addressed This Visit    None  Visit Diagnoses       Urinary tract infection without hematuria, site unspecified    -  Primary    Relevant Medications    nitrofurantoin, macrocrystal-monohydrate, (Macrobid) 100 mg capsule    UTI symptoms        Relevant Orders    POCT UA (nonautomated) manually resulted (Completed)          58-year-old patient with the following issues.    1.  Urinary tract infection with evidence of abnormalities noted in the urine analysis at this point I would like to treat her with Macrobid the prescription was sent to the pharmacy urine culture has been obtained we will modify the treatment if necessary.    I instructed the patient if she starts having any vaginal discharge or itching or irritation to contact my office we will go ahead and proceed with vaginal swab and we will treat her accordingly patient stated understanding all questions were addressed no other active issues or concerns.  Janine Verma MD   Answers submitted by the patient for this visit:  Female Genital Questionnaire (Submitted on 2/7/2024)  Chief Complaint: Female genitourinary complaint  genital itching: No  genital lesions: No  genital rash: No  missed menses: No  vaginal bleeding: Yes  vaginal discharge: " Yes  Chronicity: new  Onset: in the past 7 days  Frequency: rarely  Progression since onset: gradually worsening  Severity of pain: moderate  Affected side: right  Pregnant now?: No  discolored urine: Yes  dysuria: Yes  flank pain: Yes  frequency: Yes  hematuria: Yes  joint pain: No  nausea: No  painful intercourse: No  sore throat: No  urgency: Yes  Vaginal bleeding: lighter than menses  Passing clots?: No  Passing tissue?: Yes  Discharge characteristics: milky, white

## 2024-02-08 NOTE — PROGRESS NOTES
Answers submitted by the patient for this visit:  Female Genital Questionnaire (Submitted on 2/7/2024)  Chief Complaint: Female genitourinary complaint  genital itching: No  genital lesions: No  genital rash: No  missed menses: No  vaginal bleeding: Yes  vaginal discharge: Yes  Chronicity: new  Onset: in the past 7 days  Frequency: rarely  Progression since onset: gradually worsening  Severity of pain: moderate  Affected side: right  Pregnant now?: No  discolored urine: Yes  dysuria: Yes  flank pain: Yes  frequency: Yes  hematuria: Yes  joint pain: No  nausea: No  painful intercourse: No  sore throat: No  urgency: Yes  Vaginal bleeding: lighter than menses  Passing clots?: No  Passing tissue?: Yes  Discharge characteristics: milky, white

## 2024-02-09 DIAGNOSIS — R30.0 DYSURIA: Primary | ICD-10-CM

## 2024-03-13 ENCOUNTER — HOSPITAL ENCOUNTER (EMERGENCY)
Facility: HOSPITAL | Age: 59
Discharge: HOME | End: 2024-03-13
Attending: STUDENT IN AN ORGANIZED HEALTH CARE EDUCATION/TRAINING PROGRAM
Payer: COMMERCIAL

## 2024-03-13 ENCOUNTER — APPOINTMENT (OUTPATIENT)
Dept: RADIOLOGY | Facility: HOSPITAL | Age: 59
End: 2024-03-13
Payer: COMMERCIAL

## 2024-03-13 VITALS
SYSTOLIC BLOOD PRESSURE: 158 MMHG | OXYGEN SATURATION: 99 % | DIASTOLIC BLOOD PRESSURE: 75 MMHG | RESPIRATION RATE: 16 BRPM | HEART RATE: 82 BPM | TEMPERATURE: 98.1 F | WEIGHT: 187 LBS | HEIGHT: 68 IN | BODY MASS INDEX: 28.34 KG/M2

## 2024-03-13 DIAGNOSIS — S20.212A RIB CONTUSION, LEFT, INITIAL ENCOUNTER: Primary | ICD-10-CM

## 2024-03-13 PROCEDURE — 71101 X-RAY EXAM UNILAT RIBS/CHEST: CPT | Mod: LT,FY

## 2024-03-13 PROCEDURE — 2500000005 HC RX 250 GENERAL PHARMACY W/O HCPCS

## 2024-03-13 PROCEDURE — 71101 X-RAY EXAM UNILAT RIBS/CHEST: CPT | Mod: LEFT SIDE | Performed by: RADIOLOGY

## 2024-03-13 PROCEDURE — 99284 EMERGENCY DEPT VISIT MOD MDM: CPT | Performed by: STUDENT IN AN ORGANIZED HEALTH CARE EDUCATION/TRAINING PROGRAM

## 2024-03-13 PROCEDURE — 99283 EMERGENCY DEPT VISIT LOW MDM: CPT

## 2024-03-13 RX ORDER — LIDOCAINE 560 MG/1
1 PATCH PERCUTANEOUS; TOPICAL; TRANSDERMAL ONCE
Status: DISCONTINUED | OUTPATIENT
Start: 2024-03-13 | End: 2024-03-13 | Stop reason: HOSPADM

## 2024-03-13 RX ORDER — OXYCODONE HYDROCHLORIDE 5 MG/1
5 TABLET ORAL EVERY 6 HOURS PRN
Qty: 9 TABLET | Refills: 0 | Status: SHIPPED | OUTPATIENT
Start: 2024-03-13 | End: 2024-03-16

## 2024-03-13 RX ORDER — LIDOCAINE 560 MG/1
1 PATCH PERCUTANEOUS; TOPICAL; TRANSDERMAL DAILY
Qty: 7 PATCH | Refills: 0 | Status: SHIPPED | OUTPATIENT
Start: 2024-03-13 | End: 2024-03-20

## 2024-03-13 RX ADMIN — LIDOCAINE 1 PATCH: 4 PATCH TOPICAL at 12:42

## 2024-03-13 ASSESSMENT — PAIN SCALES - GENERAL
PAINLEVEL_OUTOF10: 5 - MODERATE PAIN

## 2024-03-13 ASSESSMENT — LIFESTYLE VARIABLES
HAVE PEOPLE ANNOYED YOU BY CRITICIZING YOUR DRINKING: NO
HAVE YOU EVER FELT YOU SHOULD CUT DOWN ON YOUR DRINKING: NO
EVER FELT BAD OR GUILTY ABOUT YOUR DRINKING: NO
EVER HAD A DRINK FIRST THING IN THE MORNING TO STEADY YOUR NERVES TO GET RID OF A HANGOVER: NO

## 2024-03-13 ASSESSMENT — COLUMBIA-SUICIDE SEVERITY RATING SCALE - C-SSRS
1. IN THE PAST MONTH, HAVE YOU WISHED YOU WERE DEAD OR WISHED YOU COULD GO TO SLEEP AND NOT WAKE UP?: NO
6. HAVE YOU EVER DONE ANYTHING, STARTED TO DO ANYTHING, OR PREPARED TO DO ANYTHING TO END YOUR LIFE?: NO
2. HAVE YOU ACTUALLY HAD ANY THOUGHTS OF KILLING YOURSELF?: NO

## 2024-03-13 ASSESSMENT — PAIN - FUNCTIONAL ASSESSMENT
PAIN_FUNCTIONAL_ASSESSMENT: 0-10

## 2024-03-13 NOTE — Clinical Note
Discharge instructions reviewed, verbalized understanding. Prescriptions printed and given to patient, verbalized understanding.  Patient denies current questions or concerns.  Follow-up appointments to be made by patient reviewed and scheduling numb er given, verbalized understanding. Telemetry & IV removed with catheter intact. Patient denies pain or discomfort. No signs or symptoms of distress noted. Patient discharged home in stable condition.  Patient states they have a way to get home.

## 2024-03-13 NOTE — ED PROVIDER NOTES
EMERGENCY DEPARTMENT ENCOUNTER      Pt Name: Aleta Varela  MRN: [id number]  Birthdate 1965  Date of evaluation: 3/13/2024    HISTORY OF PRESENT ILLNESS    Aleta Varela is an 58 y.o. female presenting to the emergency department for pain after fall.  Patient had a mechanical fall 2 days ago.  She was walking on flat ground when she missed a step causing her to fall forward.  Patient did not lose consciousness.  She landed with a lot of her weight onto her left rib.  Since then she has had severe pain.  Patient was concerned that she may have a fractured rib.  She denies any difficulty breathing but does have pain with deep breathing.  No other injuries.      PAST MEDICAL HISTORY     Past Medical History:   Diagnosis Date    Allergic rhinitis, unspecified 11/13/2020    Allergic rhinitis    Personal history of other specified conditions     History of prolonged Q-T interval on ECG       SURGICAL HISTORY       Past Surgical History:   Procedure Laterality Date    OTHER SURGICAL HISTORY  04/17/2014    Ovarian Cystectomy       CURRENT MEDICATIONS       Previous Medications    ASCORBIC ACID (VITAMIN C ORAL)    Take by mouth.    ASPIRIN 81 MG EC TABLET    Take 1 tablet (81 mg) by mouth once daily.    ATORVASTATIN (LIPITOR) 40 MG TABLET    Take 1 tablet (40 mg) by mouth once daily.    BIOTIN ORAL    Take by mouth.    CHOLECALCIFEROL (VITAMIN D3) 50 MCG (2000 UT) TABLET    Take 1 tablet (50 mcg) by mouth once daily.    ESTRADIOL (ESTRACE) 0.01 % (0.1 MG/GRAM) VAGINAL CREAM    Insert 0.5 Applicatorfuls (2 g) into the vagina once daily at bedtime. At bedtime for 2 weeks, then at bedtime twice a week.     MG TABLET    TAKE ONE TABLET BY MOUTH THREE TIMES A DAY AS NEEDED FOR SCIATICA    MILK THISTLE 175 MG TABLET    Take 1 tablet (175 mg) by mouth once daily.       ALLERGIES     Patient has no known allergies.    FAMILY HISTORY       Family History   Problem Relation Name Age of Onset    Other (cardiac  disorder) Mother      Lung cancer Father      Drug abuse Son      Cancer Other          SOCIAL HISTORY       Social History     Socioeconomic History    Marital status:      Spouse name: None    Number of children: None    Years of education: None    Highest education level: None   Occupational History     Employer: [employer]   Tobacco Use    Smoking status: Former     Packs/day: 1.50     Years: 15.00     Additional pack years: 0.00     Total pack years: 22.50     Types: Cigarettes    Smokeless tobacco: Never    Tobacco comments:     I quit smoking 14 years ago.   Vaping Use    Vaping Use: Never used   Substance and Sexual Activity    Alcohol use: Yes     Comment: Moderate    Drug use: Never    Sexual activity: Yes   Other Topics Concern    None   Social History Narrative    None     Social Determinants of Health     Financial Resource Strain: Not on file   Food Insecurity: Not on file   Transportation Needs: Not on file   Physical Activity: Not on file   Stress: Not on file   Social Connections: Not on file   Intimate Partner Violence: Not on file   Housing Stability: Not on file       PHYSICAL EXAM       ED Triage Vitals [03/13/24 1104]   Temperature Heart Rate Respirations BP   36.9 °C (98.4 °F) 82 18 156/82      Pulse Ox Temp Source Heart Rate Source Patient Position   100 % Temporal Monitor Sitting      BP Location FiO2 (%)     Right arm --       Physical Exam  Vitals and nursing note reviewed.   Constitutional:       General: She is not in acute distress.     Appearance: She is well-developed.   HENT:      Head: Normocephalic and atraumatic.   Eyes:      Conjunctiva/sclera: Conjunctivae normal.   Cardiovascular:      Rate and Rhythm: Normal rate and regular rhythm.      Heart sounds: No murmur heard.  Pulmonary:      Effort: Pulmonary effort is normal. No respiratory distress.      Breath sounds: Normal breath sounds.   Chest:      Chest wall: Tenderness present.       Abdominal:      Palpations:  Abdomen is soft.      Tenderness: There is no abdominal tenderness.   Musculoskeletal:         General: No swelling.      Cervical back: Neck supple.   Skin:     General: Skin is warm and dry.      Capillary Refill: Capillary refill takes less than 2 seconds.   Neurological:      Mental Status: She is alert.   Psychiatric:         Mood and Affect: Mood normal.          DIAGNOSTIC RESULTS     LABS:  Labs Reviewed - No data to display    All other labs were within normal range or not returned as of this dictation.    Imaging  XR ribs 2 views left w chest pa or ap    (Results Pending)        Procedures  Procedures     EMERGENCY DEPARTMENT COURSE/MDM:   Medical Decision Making  Aleta Varela is an 58 y.o. female presenting to the emergency department for pain after fall.  Patient does have some pain on examination.  She has been using Tylenol and Motrin.  Patient given lidocaine patch here in the emergency department and x-ray ordered.    X-ray reviewed and interpreted independently.  No evidence of acute rib fracturing.  Patient discharged with lidocaine patches and care instructions.        Diagnoses as of 03/15/24 1357   Rib contusion, left, initial encounter        External records reviewed: recent inpatient, clinic, and prior ED notes  Labs and Diagnostic imaging independently reviewed/interpreted by me.    Patient plan, care, lab results and imaging were all discussed with attending.    ED Medications administered this visit:  Medications - No data to display  New Prescriptions from this visit:    New Prescriptions    No medications on file       (Please note that portions of this note were completed with a voice recognition program.  Efforts were made to edit the dictations but occasionally words are mis-transcribed.)     Kusum Good DO  Resident  03/15/24 6870

## 2024-04-01 ENCOUNTER — APPOINTMENT (OUTPATIENT)
Dept: RADIOLOGY | Facility: CLINIC | Age: 59
End: 2024-04-01
Payer: COMMERCIAL

## 2024-05-18 DIAGNOSIS — M54.9 BACK PAIN, UNSPECIFIED BACK LOCATION, UNSPECIFIED BACK PAIN LATERALITY, UNSPECIFIED CHRONICITY: ICD-10-CM

## 2024-05-18 RX ORDER — IBUPROFEN 800 MG/1
800 TABLET ORAL EVERY 8 HOURS PRN
Qty: 90 TABLET | Refills: 1 | Status: SHIPPED | OUTPATIENT
Start: 2024-05-18

## 2024-06-13 DIAGNOSIS — K21.9 GASTROESOPHAGEAL REFLUX DISEASE WITHOUT ESOPHAGITIS: ICD-10-CM

## 2024-06-13 DIAGNOSIS — Z12.11 COLON CANCER SCREENING: Primary | ICD-10-CM

## 2024-06-20 DIAGNOSIS — Z12.11 COLON CANCER SCREENING: ICD-10-CM

## 2024-06-20 RX ORDER — SODIUM, POTASSIUM,MAG SULFATES 17.5-3.13G
1 SOLUTION, RECONSTITUTED, ORAL ORAL 2 TIMES DAILY
Qty: 1 EACH | Refills: 0 | Status: SHIPPED | OUTPATIENT
Start: 2024-06-20 | End: 2024-06-21

## 2024-07-11 ENCOUNTER — TELEPHONE (OUTPATIENT)
Dept: GASTROENTEROLOGY | Facility: CLINIC | Age: 59
End: 2024-07-11
Payer: COMMERCIAL

## 2024-07-11 NOTE — TELEPHONE ENCOUNTER
Your colonoscopy and EGD scheduled for Oct. 9 have been cancelled as Dr. Craig will be out of the office.  We will call you back as soon as Dr. Craig' schedule opens to get these procedures rescheduled.  Sorry for any inconvenience.  Thank you.

## 2024-07-23 ENCOUNTER — TELEPHONE (OUTPATIENT)
Dept: GASTROENTEROLOGY | Facility: CLINIC | Age: 59
End: 2024-07-23
Payer: COMMERCIAL

## 2024-07-23 NOTE — TELEPHONE ENCOUNTER
Left message for patient to return call to schedule egd/colon with Dr. Craig. Left direct number for contact.

## 2024-08-22 ENCOUNTER — TELEPHONE (OUTPATIENT)
Dept: GASTROENTEROLOGY | Facility: CLINIC | Age: 59
End: 2024-08-22
Payer: COMMERCIAL

## 2024-08-22 NOTE — TELEPHONE ENCOUNTER
Left a message confirming your appointment for colonoscopy/EGD with Dr. Craig for Oct. 4, 2024.  Unfortunately she does not have any morning appointments; she is booked out through the end of the year; you can keep this appointment or cancel and reschedule in 2025.  Please call  if you have any questions. Thank you.

## 2024-08-26 DIAGNOSIS — J06.9 UPPER RESPIRATORY TRACT INFECTION, UNSPECIFIED TYPE: Primary | ICD-10-CM

## 2024-08-26 RX ORDER — AMOXICILLIN AND CLAVULANATE POTASSIUM 875; 125 MG/1; MG/1
875 TABLET, FILM COATED ORAL 2 TIMES DAILY
Qty: 20 TABLET | Refills: 0 | Status: SHIPPED | OUTPATIENT
Start: 2024-08-26 | End: 2024-09-05

## 2024-08-27 ENCOUNTER — HOSPITAL ENCOUNTER (OUTPATIENT)
Dept: RADIOLOGY | Facility: CLINIC | Age: 59
Discharge: HOME | End: 2024-08-27
Payer: COMMERCIAL

## 2024-08-27 DIAGNOSIS — Z87.891 FORMER SMOKER: ICD-10-CM

## 2024-08-27 PROCEDURE — 71250 CT THORAX DX C-: CPT | Performed by: RADIOLOGY

## 2024-08-27 PROCEDURE — 71250 CT THORAX DX C-: CPT

## 2024-08-29 DIAGNOSIS — R91.8 ABNORMAL CT SCAN, LUNG: Primary | ICD-10-CM

## 2024-08-29 RX ORDER — DOXYCYCLINE 100 MG/1
100 CAPSULE ORAL 2 TIMES DAILY
Qty: 14 CAPSULE | Refills: 0 | Status: SHIPPED | OUTPATIENT
Start: 2024-08-29 | End: 2024-09-05

## 2024-08-29 RX ORDER — ALBUTEROL SULFATE 90 UG/1
2 INHALANT RESPIRATORY (INHALATION) EVERY 4 HOURS PRN
Qty: 8.5 G | Refills: 5 | Status: SHIPPED | OUTPATIENT
Start: 2024-08-29 | End: 2025-08-29

## 2024-08-30 ENCOUNTER — APPOINTMENT (OUTPATIENT)
Dept: RADIOLOGY | Facility: CLINIC | Age: 59
End: 2024-08-30
Payer: COMMERCIAL

## 2024-09-01 DIAGNOSIS — M54.9 BACK PAIN, UNSPECIFIED BACK LOCATION, UNSPECIFIED BACK PAIN LATERALITY, UNSPECIFIED CHRONICITY: ICD-10-CM

## 2024-09-03 RX ORDER — IBUPROFEN 800 MG/1
TABLET ORAL
Qty: 90 TABLET | Refills: 0 | Status: SHIPPED | OUTPATIENT
Start: 2024-09-03

## 2024-10-04 ENCOUNTER — APPOINTMENT (OUTPATIENT)
Dept: GASTROENTEROLOGY | Facility: EXTERNAL LOCATION | Age: 59
End: 2024-10-04
Payer: COMMERCIAL

## 2024-10-09 ENCOUNTER — APPOINTMENT (OUTPATIENT)
Dept: GASTROENTEROLOGY | Facility: EXTERNAL LOCATION | Age: 59
End: 2024-10-09
Payer: COMMERCIAL

## 2024-10-19 DIAGNOSIS — J06.9 UPPER RESPIRATORY TRACT INFECTION, UNSPECIFIED TYPE: Primary | ICD-10-CM

## 2024-10-19 RX ORDER — DOXYCYCLINE 100 MG/1
100 CAPSULE ORAL 2 TIMES DAILY
Qty: 14 CAPSULE | Refills: 0 | Status: SHIPPED | OUTPATIENT
Start: 2024-10-19 | End: 2024-10-26

## 2024-10-31 DIAGNOSIS — R93.1 ELEVATED CORONARY ARTERY CALCIUM SCORE: ICD-10-CM

## 2024-10-31 DIAGNOSIS — R93.1 ELEVATED CORONARY ARTERY CALCIUM SCORE: Primary | ICD-10-CM

## 2024-10-31 RX ORDER — ASPIRIN 81 MG/1
81 TABLET ORAL DAILY
Qty: 90 TABLET | Refills: 3 | Status: SHIPPED | OUTPATIENT
Start: 2024-10-31 | End: 2024-10-31 | Stop reason: SDUPTHER

## 2024-10-31 RX ORDER — ASPIRIN 81 MG/1
81 TABLET ORAL DAILY
Qty: 30 TABLET | Refills: 0 | Status: SHIPPED | OUTPATIENT
Start: 2024-10-31 | End: 2025-10-31

## 2024-11-09 ENCOUNTER — APPOINTMENT (OUTPATIENT)
Dept: RADIOLOGY | Facility: HOSPITAL | Age: 59
End: 2024-11-09
Payer: COMMERCIAL

## 2024-11-09 ENCOUNTER — HOSPITAL ENCOUNTER (EMERGENCY)
Facility: HOSPITAL | Age: 59
Discharge: HOME | End: 2024-11-09
Attending: EMERGENCY MEDICINE
Payer: COMMERCIAL

## 2024-11-09 VITALS
DIASTOLIC BLOOD PRESSURE: 74 MMHG | OXYGEN SATURATION: 98 % | SYSTOLIC BLOOD PRESSURE: 142 MMHG | RESPIRATION RATE: 18 BRPM | BODY MASS INDEX: 29.25 KG/M2 | HEART RATE: 81 BPM | HEIGHT: 68 IN | TEMPERATURE: 97.5 F | WEIGHT: 193 LBS

## 2024-11-09 DIAGNOSIS — T78.3XXA ANGIOEDEMA, INITIAL ENCOUNTER: Primary | ICD-10-CM

## 2024-11-09 LAB
FLUAV RNA RESP QL NAA+PROBE: NOT DETECTED
FLUBV RNA RESP QL NAA+PROBE: NOT DETECTED
S PYO DNA THROAT QL NAA+PROBE: NOT DETECTED
SARS-COV-2 RNA RESP QL NAA+PROBE: NOT DETECTED

## 2024-11-09 PROCEDURE — 71045 X-RAY EXAM CHEST 1 VIEW: CPT | Performed by: RADIOLOGY

## 2024-11-09 PROCEDURE — 99283 EMERGENCY DEPT VISIT LOW MDM: CPT | Mod: 25

## 2024-11-09 PROCEDURE — 87636 SARSCOV2 & INF A&B AMP PRB: CPT | Performed by: STUDENT IN AN ORGANIZED HEALTH CARE EDUCATION/TRAINING PROGRAM

## 2024-11-09 PROCEDURE — 2500000004 HC RX 250 GENERAL PHARMACY W/ HCPCS (ALT 636 FOR OP/ED): Performed by: EMERGENCY MEDICINE

## 2024-11-09 PROCEDURE — 71045 X-RAY EXAM CHEST 1 VIEW: CPT

## 2024-11-09 PROCEDURE — 2500000001 HC RX 250 WO HCPCS SELF ADMINISTERED DRUGS (ALT 637 FOR MEDICARE OP): Performed by: EMERGENCY MEDICINE

## 2024-11-09 PROCEDURE — 87651 STREP A DNA AMP PROBE: CPT | Performed by: STUDENT IN AN ORGANIZED HEALTH CARE EDUCATION/TRAINING PROGRAM

## 2024-11-09 RX ORDER — FAMOTIDINE 40 MG/1
40 TABLET, FILM COATED ORAL DAILY
Qty: 7 TABLET | Refills: 0 | Status: SHIPPED | OUTPATIENT
Start: 2024-11-09 | End: 2024-11-16

## 2024-11-09 RX ORDER — DIPHENHYDRAMINE HCL 25 MG
25 TABLET ORAL EVERY 6 HOURS PRN
Qty: 20 TABLET | Refills: 0 | Status: SHIPPED | OUTPATIENT
Start: 2024-11-09

## 2024-11-09 RX ORDER — PREDNISONE 50 MG/1
50 TABLET ORAL ONCE
Status: COMPLETED | OUTPATIENT
Start: 2024-11-09 | End: 2024-11-09

## 2024-11-09 RX ORDER — PREDNISONE 50 MG/1
50 TABLET ORAL DAILY
Qty: 5 TABLET | Refills: 0 | Status: SHIPPED | OUTPATIENT
Start: 2024-11-09

## 2024-11-09 RX ORDER — FAMOTIDINE 20 MG/1
20 TABLET, FILM COATED ORAL ONCE
Status: COMPLETED | OUTPATIENT
Start: 2024-11-09 | End: 2024-11-09

## 2024-11-09 RX ADMIN — PREDNISONE 50 MG: 50 TABLET ORAL at 15:47

## 2024-11-09 RX ADMIN — FAMOTIDINE 20 MG: 20 TABLET ORAL at 15:47

## 2024-11-09 ASSESSMENT — PAIN DESCRIPTION - PAIN TYPE: TYPE: ACUTE PAIN

## 2024-11-09 ASSESSMENT — COLUMBIA-SUICIDE SEVERITY RATING SCALE - C-SSRS
2. HAVE YOU ACTUALLY HAD ANY THOUGHTS OF KILLING YOURSELF?: NO
6. HAVE YOU EVER DONE ANYTHING, STARTED TO DO ANYTHING, OR PREPARED TO DO ANYTHING TO END YOUR LIFE?: NO
1. IN THE PAST MONTH, HAVE YOU WISHED YOU WERE DEAD OR WISHED YOU COULD GO TO SLEEP AND NOT WAKE UP?: NO

## 2024-11-09 ASSESSMENT — PAIN - FUNCTIONAL ASSESSMENT
PAIN_FUNCTIONAL_ASSESSMENT: 0-10
PAIN_FUNCTIONAL_ASSESSMENT: 0-10

## 2024-11-09 ASSESSMENT — PAIN SCALES - GENERAL
PAINLEVEL_OUTOF10: 0 - NO PAIN
PAINLEVEL_OUTOF10: 3
PAINLEVEL_OUTOF10: 4

## 2024-11-09 NOTE — DISCHARGE INSTRUCTIONS
Seek immediate medical attention if you develop: additional swelling of your tongue,  redness of your skin, difficulty swallowing, drooling, difficulty breathing, chest pain, shortness of breath, lightheadedness, dizziness,  rash, or worsening symptoms.

## 2024-11-10 NOTE — ED PROVIDER NOTES
HPI   Chief Complaint   Patient presents with    Oral Swelling     Pt states she woke up this morning and noticed her tongue swelling. States she is concerned she may have strept.        The patient is a 58-year-old female with a history of pneumonia who presents to the emergency department with tongue swelling.  The patient states that she woke up this morning and noticed that her tongue was swollen.  The patient states that she is concerned that her tongue swelling may be related to strep throat.  The patient states that she noticed that she had voice changes this morning with the tongue swelling.  This is the first time the patient states that she has had tongue swelling.  The patient has had 2 bouts of pneumonia this year according to the patient.  The patient denies any tobacco use, alcohol use, or illicit drug use.  The patient also states that she is concerned that she may be having a stroke because her mother has had a stroke in the past.  The patient denies the use of ACE inhibitor's.  The patient denies any shortness of breath, difficulty breathing, nausea, vomiting, chest pain, or abdominal pain.      History provided by:  Patient   used: No            Patient History   Past Medical History:   Diagnosis Date    Allergic rhinitis, unspecified 11/13/2020    Allergic rhinitis    Personal history of other specified conditions     History of prolonged Q-T interval on ECG     Past Surgical History:   Procedure Laterality Date    OTHER SURGICAL HISTORY  04/17/2014    Ovarian Cystectomy     Family History   Problem Relation Name Age of Onset    Other (cardiac disorder) Mother      Lung cancer Father      Drug abuse Son      Cancer Other       Social History     Tobacco Use    Smoking status: Former     Current packs/day: 1.50     Average packs/day: 1.5 packs/day for 15.0 years (22.5 ttl pk-yrs)     Types: Cigarettes    Smokeless tobacco: Never    Tobacco comments:     I quit smoking 14 years  ago.   Vaping Use    Vaping status: Never Used   Substance Use Topics    Alcohol use: Yes     Comment: Moderate    Drug use: Never       Physical Exam   ED Triage Vitals [11/09/24 1449]   Temperature Heart Rate Respirations BP   36.3 °C (97.3 °F) 82 16 158/72      Pulse Ox Temp Source Heart Rate Source Patient Position   99 % Temporal Monitor Sitting      BP Location FiO2 (%)     Right arm --       Physical Exam  Constitutional:       General: She is not in acute distress.     Appearance: Normal appearance. She is not ill-appearing, toxic-appearing or diaphoretic.   HENT:      Head: Normocephalic and atraumatic.      Nose: Nose normal. No congestion or rhinorrhea.      Mouth/Throat:      Mouth: Mucous membranes are moist.      Pharynx: Oropharynx is clear. No oropharyngeal exudate or posterior oropharyngeal erythema.      Comments: No tongue swelling or oral abnormalities on physical exam.  No lip swelling.  Eyes:      General: No scleral icterus.        Right eye: No discharge.         Left eye: No discharge.      Extraocular Movements: Extraocular movements intact.      Conjunctiva/sclera: Conjunctivae normal.   Cardiovascular:      Pulses: Normal pulses.      Heart sounds: Normal heart sounds.   Musculoskeletal:         General: No swelling, tenderness, deformity or signs of injury. Normal range of motion.      Cervical back: Normal range of motion and neck supple. No rigidity or tenderness.      Right lower leg: No edema.      Left lower leg: No edema.   Lymphadenopathy:      Cervical: No cervical adenopathy.   Skin:     General: Skin is warm.      Coloration: Skin is not jaundiced or pale.      Findings: No bruising, erythema, lesion or rash.   Neurological:      General: No focal deficit present.      Mental Status: She is alert.   Psychiatric:         Mood and Affect: Mood normal.         Behavior: Behavior normal.         Thought Content: Thought content normal.         Judgment: Judgment normal.            ED Course & MDM   Diagnoses as of 11/09/24 2147   Angioedema, initial encounter                 No data recorded     Blanca Coma Scale Score: 15 (11/09/24 1549 : Cristal Swann RN)                           Medical Decision Making  The patient received PCR testing for COVID, influenza, and RSV due to possibility of a viral respiratory infection.  The patient's viral swabs were negative, indicating the patient is unlikely to be affected by the tested viruses.  The patient received a chest x-ray due to having a history of pneumonia and that her tongue swelling may have a respiratory etiology.  The patient's chest x-ray did not reveal signs of pneumonia or acute significant abnormalities.  The patient was reassured of her results and discharged with return precautions to the emergency department.  The patient was referred to her primary care provider for further management of her symptoms.    =================Attending note===============    The patient was seen by the resident/fellow.  I have personally performed a substantive portion of the encounter.  I have seen and examined the patient; agree with the workup, evaluation, MDM,   management and diagnosis.  The care plan has been discussed with the resident; I have reviewed the resident's note and agree with the documented findings.      This is a 58 y.o. female who presents to ER with tongue swelling that started around 6 PM.  No new exposures that she is aware of.  She did take Benadryl prior to arrival.  No shortness of breath or drooling.  No trismus.  She felt like it was hard to speak because her tongue was swollen.  She states it is markedly improved from when it started.  No sick contacts.  No injury.  She not bite her tongue.  No history of similar issues.  She has had a dry cough.  Her other medications are aspirin and a statin.  She is not on any ACE inhibitors.    She states that her tongue feels back to normal now.  There is no enlarged tongue  on exam.  No drooling or trismus or stridor.  Posterior pharynx is normal.  No cervical adenopathy.  No wheezing.  Heart regular.  Abdomen soft and nontender.  No skin rashes.  Moving extremities x 4 without difficulty.    She has been having a cough for over a month.  States she has had multiple bouts of pneumonia this year.  X-ray is no pneumonia.  COVID and flu are negative.  Strep is negative.    She is given prescription for Benadryl, Pepcid, prednisone.  She is advised to try to figure out if there is any new exposures that may have triggered this angioedema.  She is to return to the nearest ER for any new or worsening symptoms.  She is happy with this plan.            ==========================================          Procedure  Procedures     Tisha Agustin MD  Resident  11/09/24 8931       Tisha Agustin MD  Resident  11/09/24 3702

## 2024-11-19 ENCOUNTER — APPOINTMENT (OUTPATIENT)
Dept: PULMONOLOGY | Facility: CLINIC | Age: 59
End: 2024-11-19
Payer: COMMERCIAL

## 2024-11-21 ENCOUNTER — APPOINTMENT (OUTPATIENT)
Dept: CARDIOLOGY | Facility: CLINIC | Age: 59
End: 2024-11-21
Payer: COMMERCIAL

## 2024-11-21 VITALS
HEIGHT: 68 IN | SYSTOLIC BLOOD PRESSURE: 126 MMHG | BODY MASS INDEX: 30.31 KG/M2 | OXYGEN SATURATION: 98 % | HEART RATE: 85 BPM | WEIGHT: 200 LBS | DIASTOLIC BLOOD PRESSURE: 80 MMHG

## 2024-11-21 DIAGNOSIS — G89.29 CHRONIC LOW BACK PAIN, UNSPECIFIED BACK PAIN LATERALITY, UNSPECIFIED WHETHER SCIATICA PRESENT: ICD-10-CM

## 2024-11-21 DIAGNOSIS — R94.31 ABNORMAL EKG: Primary | ICD-10-CM

## 2024-11-21 DIAGNOSIS — Z82.79 FAMILY HISTORY OF CONGENITAL HEART DISEASE: ICD-10-CM

## 2024-11-21 DIAGNOSIS — I45.81 LONG Q-T SYNDROME: ICD-10-CM

## 2024-11-21 DIAGNOSIS — K57.92 DIVERTICULITIS: ICD-10-CM

## 2024-11-21 DIAGNOSIS — R53.83 FATIGUE, UNSPECIFIED TYPE: ICD-10-CM

## 2024-11-21 DIAGNOSIS — M54.50 CHRONIC LOW BACK PAIN, UNSPECIFIED BACK PAIN LATERALITY, UNSPECIFIED WHETHER SCIATICA PRESENT: ICD-10-CM

## 2024-11-21 DIAGNOSIS — Z87.891 FORMER SMOKER: ICD-10-CM

## 2024-11-21 DIAGNOSIS — E78.00 ELEVATED LDL CHOLESTEROL LEVEL: ICD-10-CM

## 2024-11-21 DIAGNOSIS — E78.2 MIXED HYPERLIPIDEMIA: ICD-10-CM

## 2024-11-21 DIAGNOSIS — R93.1 ELEVATED CORONARY ARTERY CALCIUM SCORE: ICD-10-CM

## 2024-11-21 DIAGNOSIS — E55.9 VITAMIN D DEFICIENCY: ICD-10-CM

## 2024-11-21 DIAGNOSIS — I45.10 INCOMPLETE RBBB: ICD-10-CM

## 2024-11-21 DIAGNOSIS — R73.9 HYPERGLYCEMIA: ICD-10-CM

## 2024-11-21 PROCEDURE — 93000 ELECTROCARDIOGRAM COMPLETE: CPT | Performed by: INTERNAL MEDICINE

## 2024-11-21 PROCEDURE — 1036F TOBACCO NON-USER: CPT | Performed by: INTERNAL MEDICINE

## 2024-11-21 PROCEDURE — 99214 OFFICE O/P EST MOD 30 MIN: CPT | Performed by: INTERNAL MEDICINE

## 2024-11-21 PROCEDURE — 3008F BODY MASS INDEX DOCD: CPT | Performed by: INTERNAL MEDICINE

## 2024-11-21 RX ORDER — ASPIRIN 81 MG/1
81 TABLET ORAL DAILY
Qty: 90 TABLET | Refills: 3 | Status: SHIPPED | OUTPATIENT
Start: 2024-11-21 | End: 2025-11-21

## 2024-11-21 RX ORDER — ATORVASTATIN CALCIUM 40 MG/1
40 TABLET, FILM COATED ORAL DAILY
Qty: 90 TABLET | Refills: 3 | Status: SHIPPED | OUTPATIENT
Start: 2024-11-21 | End: 2025-11-21

## 2024-11-21 NOTE — PROGRESS NOTES
CARDIOLOGY OFFICE NOTE     Date:   11/21/2024    Patient:    Aleta aVrela    YOB: 1965    Primary Physician: Matti An DO       Reason for Visit: 1 year cardiology follow-up.       HPI:     Aleta Varela was seen in cardiac evaluation at the  Cardiology office November 21, 2024.      The patients problems are listed as in the impression below.    Electronic medical records reviewed.    The patient returns.  She overall from a cardiovascular standpoint feels well.  She has had history of long QT syndrome elevated CT calcium score and prior syncope and palpitations.  She had scheduled testing last year but has had 2 bouts of significant pneumonias with her last about a month ago.  She is now feeling better.  She is exercising.  She has no symptoms overall.    Patient denies Chest Pain, SOB, Lightheadedness, Dizziness, TIA or CVA symptoms.  No CHF or Edema.  No Palpitations.  No GI,  or Bleeding Issues. No Recent Fever or Chills.     She would like to get the test that were scheduled from before but would like to wait until after the first year to get better and stronger.    Cardiovascular and general review of systems is otherwise negative.    A 14-system review is otherwise negative, other than noted.     PHYSICAL EXAMINATION:      Vitals:    11/21/24 1247   BP: 126/80   Pulse: 85   SpO2: 98%     General: No acute distress. Alert and oriented.  Head And Neck Examination: No jugular venous distention, no carotid bruits, no mass. Carotid upstrokes preserved. Oral mucosa moist.  No xanthelasma. Head and neck examination otherwise unremarkable.  Lungs: Clear to auscultation and percussion. No wheezes, no rales, and no rhonchi.  Chest: Excursion appeared to be normal. No chest wall tenderness on palpation.  Heart: Normal S1 and S2. No S3. No S4. No rub. Grade 1/6 systolic murmur, best heard at the left sternal border. Point of maximal impulse was within normal limits.  Abdomen: Soft.  Nontender. No organomegaly. No bruits. No masses.Obese.  Extremities: No bipedal edema. No clubbing. No cyanosis. Pulses are strong throughout. No bruits.  Musculoskeletal Exam: No ulcers, otherwise unremarkable.  Neuro: Neurologically appeared grossly intact.  .  IMPRESSION:      Cardiovascular status stable  Palpitations  Syncopal episode past, no recurrence.  Abnormal rest electrocardiogram  History of long QT syndrome  Elevated calcium score, 172, 9/2023  Hyperlipidemia, probable familial  Elevated glucose  Vitamin D Deficiency  Diverticulosis  Chronic lower back pain  Past tobacco abuse  Family history of congenital heart disease and coronary artery disease   Obesity  Otherwise as per assessment below.    RECOMMENDATIONS:      The patient has above-noted history and findings.  Overall she feels well following her recent pneumonias.  She would like to do her previously scheduled testing after the first of the year when she has recovered a bit more from her recent pneumonias.  Will reschedule exercise Myoview cardiac fusion stress test, echocardiogram and 48-hour Holter monitoring.    She will continue her current medication.  Refills were provided.    Exercise dietary program.    Hydration.    [employer] portal use was encouraged.    We will plan to see back in 6 months with the above testing, laboratory Studies and ECG as ordered.     Patient will follow up with their primary physician for general care.    The patient knows to contact medical care earlier if need be.      ALLERGIES:     No Known Allergies     MEDICATIONS:     Current Outpatient Medications   Medication Instructions    albuterol (ProAir HFA) 90 mcg/actuation inhaler 2 puffs, inhalation, Every 4 hours PRN    ascorbic acid (VITAMIN C ORAL) Take by mouth.    aspirin 81 mg, oral, Daily    atorvastatin (LIPITOR) 40 mg, oral, Daily    BIOTIN ORAL Take by mouth.    cholecalciferol (VITAMIN D3) 50 mcg, Daily    diphenhydrAMINE (BENADRYL ALLERGY) 25 mg,  oral, Every 6 hours PRN    estradiol (ESTRACE) 2 g, vaginal, Nightly, At bedtime for 2 weeks, then at bedtime twice a week.    famotidine (PEPCID) 40 mg, oral, Daily     mg tablet TAKE 1 TABLET (800 MG) BY MOUTH EVERY 8 HOURS IF NEEDED FOR MODERATE PAIN (4-6).    milk thistle 175 mg, Daily    predniSONE (DELTASONE) 50 mg, oral, Daily       ELECTROCARDIOGRAM:      Sinus rhythm, incomplete right bundle branch block, nonspecific ST wave changes.  Rate 88.    CARDIAC TESTING:      None this visit    LABORATORY DATA:      None of recent for review.    CBC:   Lab Results   Component Value Date    WBC 6.5 09/15/2023    RBC 5.36 (H) 09/15/2023    HGB 16.0 09/15/2023    HCT 47.7 (H) 09/15/2023     09/15/2023        CMP:    Lab Results   Component Value Date     (L) 09/15/2023    K 4.4 09/15/2023     09/15/2023    CO2 25 09/15/2023    BUN 9 09/15/2023    CREATININE 0.72 09/15/2023    GLUCOSE 100 (H) 09/15/2023    CALCIUM 9.3 09/15/2023     Lipid Profile:    Lab Results   Component Value Date    CHOL 192 02/07/2024    TRIG 116 02/07/2024    HDL 68.9 02/07/2024    LDLF 212 (H) 09/15/2023     Hepatic Function Panel:    Lab Results   Component Value Date    ALKPHOS 92 02/07/2024    ALT 13 02/07/2024    AST 12 02/07/2024    PROT 6.5 02/07/2024    BILITOT 0.5 02/07/2024    BILIDIR 0.1 02/07/2024     TSH:    Lab Results   Component Value Date    TSH 1.73 09/15/2023                     PROBLEM LIST:     Patient Active Problem List   Diagnosis    Abnormal mammogram    Acute bacterial sinusitis    Allergic rhinitis    Astigmatism, bilateral    Back pain    Bilateral presbyopia    Brow ptosis, bilateral    Dermatochalasis of left upper eyelid    Dermatochalasis of right upper eyelid    Fatigue    High risk HPV infection    Hypermetropia of both eyes    LGSIL of cervix of undetermined significance    Post-nasal drainage    Sciatica    Stress    Vitamin D deficiency    Elevated coronary artery calcium score     Mixed hyperlipidemia    Former smoker    Diverticulitis    Family history of congenital heart disease    Long Q-T syndrome    Elevated LDL cholesterol level             Ventura Norwood MD, [facility]   [facility] / [facility] /  Cardiology      Of Note:  [employer] voice recognition dictation software was utilized partially in the preparation of this note, therefore, inaccuracies in spelling, word choice and punctuation may have occurred which were not recognized the time of signing.    Patient was seen and examined with total time of visit including chart preparation, rooming, and chart completion exceeding 40 minutes.      ----

## 2024-11-22 ENCOUNTER — APPOINTMENT (OUTPATIENT)
Dept: CARDIOLOGY | Facility: CLINIC | Age: 59
End: 2024-11-22
Payer: COMMERCIAL

## 2024-12-10 ENCOUNTER — APPOINTMENT (OUTPATIENT)
Dept: OBSTETRICS AND GYNECOLOGY | Facility: CLINIC | Age: 59
End: 2024-12-10
Payer: COMMERCIAL

## 2025-01-04 ENCOUNTER — TELEPHONE (OUTPATIENT)
Dept: PRIMARY CARE | Facility: CLINIC | Age: 60
End: 2025-01-04
Payer: COMMERCIAL

## 2025-01-04 DIAGNOSIS — J06.9 UPPER RESPIRATORY TRACT INFECTION, UNSPECIFIED TYPE: Primary | ICD-10-CM

## 2025-01-04 RX ORDER — DOXYCYCLINE 100 MG/1
100 CAPSULE ORAL 2 TIMES DAILY
Qty: 20 CAPSULE | Refills: 0 | Status: SHIPPED | OUTPATIENT
Start: 2025-01-04 | End: 2025-01-14

## 2025-01-04 RX ORDER — PREDNISONE 20 MG/1
40 TABLET ORAL DAILY
Qty: 10 TABLET | Refills: 0 | Status: SHIPPED | OUTPATIENT
Start: 2025-01-04 | End: 2025-01-09

## 2025-01-04 NOTE — TELEPHONE ENCOUNTER
Patient called with cough.  Has been taking Mucinex.  Has had pneumonia in the past and feels similar.  Has noticed some wheezing- otherwise feels OK.  Will start on doxycycline twice daily.  Will do prednisone burst as well.  She will keep me updated and let me know if things don't improve.

## 2025-01-06 ENCOUNTER — APPOINTMENT (OUTPATIENT)
Dept: PULMONOLOGY | Facility: CLINIC | Age: 60
End: 2025-01-06
Payer: COMMERCIAL
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
## 2025-01-28 ENCOUNTER — OFFICE VISIT (OUTPATIENT)
Dept: PRIMARY CARE | Facility: CLINIC | Age: 60
End: 2025-01-28
Payer: COMMERCIAL

## 2025-01-28 ENCOUNTER — HOSPITAL ENCOUNTER (OUTPATIENT)
Dept: RADIOLOGY | Facility: CLINIC | Age: 60
Discharge: HOME | End: 2025-01-28
Payer: COMMERCIAL

## 2025-01-28 VITALS
TEMPERATURE: 97.6 F | RESPIRATION RATE: 16 BRPM | HEART RATE: 72 BPM | SYSTOLIC BLOOD PRESSURE: 128 MMHG | BODY MASS INDEX: 29.95 KG/M2 | DIASTOLIC BLOOD PRESSURE: 70 MMHG | WEIGHT: 197 LBS | OXYGEN SATURATION: 98 %

## 2025-01-28 DIAGNOSIS — J01.90 ACUTE NON-RECURRENT SINUSITIS, UNSPECIFIED LOCATION: ICD-10-CM

## 2025-01-28 DIAGNOSIS — R51.9 ACUTE INTRACTABLE HEADACHE, UNSPECIFIED HEADACHE TYPE: ICD-10-CM

## 2025-01-28 DIAGNOSIS — J06.9 UPPER RESPIRATORY TRACT INFECTION, UNSPECIFIED TYPE: Primary | ICD-10-CM

## 2025-01-28 LAB
POC BINAX EXPIRATION: NEGATIVE
POC BINAX NOW COVID SERIAL NUMBER: NEGATIVE
POC RAPID INFLUENZA A: NEGATIVE
POC RAPID INFLUENZA B: NEGATIVE
POC SARS-COV-2 AG BINAX: NORMAL

## 2025-01-28 PROCEDURE — 87804 INFLUENZA ASSAY W/OPTIC: CPT | Performed by: INTERNAL MEDICINE

## 2025-01-28 PROCEDURE — 70450 CT HEAD/BRAIN W/O DYE: CPT

## 2025-01-28 PROCEDURE — 87811 SARS-COV-2 COVID19 W/OPTIC: CPT | Performed by: INTERNAL MEDICINE

## 2025-01-28 PROCEDURE — 1036F TOBACCO NON-USER: CPT | Performed by: INTERNAL MEDICINE

## 2025-01-28 PROCEDURE — 99214 OFFICE O/P EST MOD 30 MIN: CPT | Performed by: INTERNAL MEDICINE

## 2025-01-28 RX ORDER — AMOXICILLIN AND CLAVULANATE POTASSIUM 875; 125 MG/1; MG/1
875 TABLET, FILM COATED ORAL 2 TIMES DAILY
Qty: 20 TABLET | Refills: 0 | Status: SHIPPED | OUTPATIENT
Start: 2025-01-28 | End: 2025-01-29 | Stop reason: ALTCHOICE

## 2025-01-28 RX ORDER — FLUTICASONE PROPIONATE 50 MCG
1 SPRAY, SUSPENSION (ML) NASAL DAILY
Qty: 16 G | Refills: 5 | Status: SHIPPED | OUTPATIENT
Start: 2025-01-28 | End: 2026-01-28

## 2025-01-28 ASSESSMENT — ENCOUNTER SYMPTOMS
SINUS PRESSURE: 1
HEADACHES: 1

## 2025-01-28 NOTE — PROGRESS NOTES
"Patient here for headache and sinus pressure     Subjective   Patient ID: Aleta Varela is a 59 y.o. female who presents for URI.    The patient mentions recent onset of cold symptoms including sinus pressure and moderate to severe headache rated as 12 on a 10 point scale of intensity yesterday.  This was associated with blurry vision, difficulty with balance, and vomiting at the time which have since subsided.  She went to sleep last night and it was improved when she woke this morning.  The patient states that the headache has improved with Excedrin and is currently at a 4 on a 10 point scale.    She has had migraines her whole life but never this bad.  She has the feeling of \"crackling\" in her sinuses as well and this feels similar to when she gets a sinus infection normally.    URI   Associated symptoms include headaches.     Review of Systems   HENT:  Positive for sinus pressure.    Neurological:  Positive for headaches.     Objective   Physical Exam  Constitutional:       Appearance: Normal appearance.   Neck:      Vascular: No carotid bruit.   Cardiovascular:      Rate and Rhythm: Normal rate and regular rhythm.      Heart sounds: Normal heart sounds.   Pulmonary:      Effort: Pulmonary effort is normal.      Breath sounds: Normal breath sounds.   Abdominal:      General: Bowel sounds are normal.      Palpations: Abdomen is soft.      Tenderness: There is no abdominal tenderness.   Skin:     General: Skin is warm and dry.   Neurological:      General: No focal deficit present.      Mental Status: She is alert and oriented to person, place, and time. Mental status is at baseline.   Psychiatric:         Mood and Affect: Mood normal.         Behavior: Behavior normal.       Assessment/Plan   Problem List Items Addressed This Visit    None  Visit Diagnoses         Codes    Upper respiratory tract infection, unspecified type    -  Primary J06.9    Relevant Orders    POCT Influenza A/B manually resulted " (Completed)    POCT BinaxNOW Covid-19 Ag Card manually resulted (Completed)    Acute intractable headache, unspecified headache type     R51.9    Relevant Orders    CT head wo IV contrast    Acute non-recurrent sinusitis, unspecified location     J01.90    Relevant Medications    amoxicillin-pot clavulanate (Augmentin) 875-125 mg tablet    fluticasone (Flonase) 50 mcg/actuation nasal spray            IMPRESSION/PLAN:     URTI  - IO Swab for COVID-19 and Influenza A/B negative.  Chest clear to auscultation bilaterally.  Prescribed amoxicillin pot-clavulanate 875-125mg BID to be taken with food to avoid adverse effects, and fluticasone 50 mcg/actuation as 2 sprays in each nostril once daily.  Drink plenty of water to remain well hydrated, and rest as much as possible.  Call the clinic if symptoms persist or worsen.      Acute Headache  - Concerned with how severe the headaches was yesterday and the symptoms that accompanied.  Improved today but not resolved.  Ordered CT Head without contrast to rule out acute pathology.    /70 in the office today.    HLD  - Cholesterol and LDL high per 9/2023.  Continue with atorvastatin 40mg every day and ASA 81mg once daily.  ASCVD 3.8% 12/2023.  CT Calcium Score 172 per 10/2023.  Following with  from Cardiology.     Recurrent Constipation  - Advised patient to try Miralax at first, and then continue with Metamucil.    Diverticulitis     -Colonoscopy previously ordered as    noted below. Feeling much better.      Woman's Wellness  -  Following with  from Obstetrics/Gynecology     Vitamin D deficiency  - check Vitamin D levels     Health Maintenance   - Routine labs 9/2023.  Last Pap 8/2018.  Last Mammogram 11/2023. Re-ordered colonoscopy.     Follow-up according to regular health maintenance in year, call sooner if needed.        Scribe Attestation  By signing my name below, I, Danie Snider   attest that this documentation has been prepared  under the direction and in the presence of DO. Marlene Soares 01/28/25 11:54 AM

## 2025-01-29 DIAGNOSIS — J01.90 ACUTE NON-RECURRENT SINUSITIS, UNSPECIFIED LOCATION: Primary | ICD-10-CM

## 2025-01-29 RX ORDER — DOXYCYCLINE 100 MG/1
100 CAPSULE ORAL 2 TIMES DAILY
Qty: 14 CAPSULE | Refills: 0 | Status: SHIPPED | OUTPATIENT
Start: 2025-01-29 | End: 2025-02-05

## 2025-01-29 NOTE — PROGRESS NOTES
Patient reached out with itching after starting Augmentin.  Took 2 doses and started itching both times after.  No tongue swelling or other symptoms.  Does not recall an issue with penicillins prior.  Will switch to doxycycline 100 mg BID.  She can do Benadryl 25 mg TID PRN as well.  Call me if worsening.

## 2025-02-01 ENCOUNTER — HOSPITAL ENCOUNTER (EMERGENCY)
Facility: HOSPITAL | Age: 60
Discharge: HOME | End: 2025-02-01
Attending: EMERGENCY MEDICINE
Payer: COMMERCIAL

## 2025-02-01 VITALS
RESPIRATION RATE: 18 BRPM | BODY MASS INDEX: 28.95 KG/M2 | WEIGHT: 191 LBS | TEMPERATURE: 97.3 F | SYSTOLIC BLOOD PRESSURE: 147 MMHG | OXYGEN SATURATION: 99 % | HEIGHT: 68 IN | HEART RATE: 75 BPM | DIASTOLIC BLOOD PRESSURE: 70 MMHG

## 2025-02-01 DIAGNOSIS — R11.0 NAUSEA: ICD-10-CM

## 2025-02-01 DIAGNOSIS — R10.13 EPIGASTRIC PAIN: Primary | ICD-10-CM

## 2025-02-01 LAB
ALBUMIN SERPL BCP-MCNC: 4.6 G/DL (ref 3.4–5)
ALP SERPL-CCNC: 68 U/L (ref 33–110)
ALT SERPL W P-5'-P-CCNC: 37 U/L (ref 7–45)
ANION GAP SERPL CALC-SCNC: 12 MMOL/L (ref 10–20)
APPEARANCE UR: CLEAR
AST SERPL W P-5'-P-CCNC: 21 U/L (ref 9–39)
BASOPHILS # BLD AUTO: 0.02 X10*3/UL (ref 0–0.1)
BASOPHILS NFR BLD AUTO: 0.5 %
BILIRUB SERPL-MCNC: 0.6 MG/DL (ref 0–1.2)
BILIRUB UR STRIP.AUTO-MCNC: NEGATIVE MG/DL
BUN SERPL-MCNC: 7 MG/DL (ref 6–23)
CALCIUM SERPL-MCNC: 9.2 MG/DL (ref 8.6–10.3)
CHLORIDE SERPL-SCNC: 103 MMOL/L (ref 98–107)
CO2 SERPL-SCNC: 27 MMOL/L (ref 21–32)
COLOR UR: YELLOW
CREAT SERPL-MCNC: 0.63 MG/DL (ref 0.5–1.05)
EGFRCR SERPLBLD CKD-EPI 2021: >90 ML/MIN/1.73M*2
EOSINOPHIL # BLD AUTO: 0.21 X10*3/UL (ref 0–0.7)
EOSINOPHIL NFR BLD AUTO: 5.8 %
ERYTHROCYTE [DISTWIDTH] IN BLOOD BY AUTOMATED COUNT: 13.2 % (ref 11.5–14.5)
GLUCOSE SERPL-MCNC: 116 MG/DL (ref 74–99)
GLUCOSE UR STRIP.AUTO-MCNC: NORMAL MG/DL
HCT VFR BLD AUTO: 41.6 % (ref 36–46)
HGB BLD-MCNC: 14 G/DL (ref 12–16)
HOLD SPECIMEN: NORMAL
IMM GRANULOCYTES # BLD AUTO: 0 X10*3/UL (ref 0–0.7)
IMM GRANULOCYTES NFR BLD AUTO: 0 % (ref 0–0.9)
KETONES UR STRIP.AUTO-MCNC: NEGATIVE MG/DL
LEUKOCYTE ESTERASE UR QL STRIP.AUTO: NEGATIVE
LIPASE SERPL-CCNC: 14 U/L (ref 9–82)
LYMPHOCYTES # BLD AUTO: 1 X10*3/UL (ref 1.2–4.8)
LYMPHOCYTES NFR BLD AUTO: 27.4 %
MCH RBC QN AUTO: 29.2 PG (ref 26–34)
MCHC RBC AUTO-ENTMCNC: 33.7 G/DL (ref 32–36)
MCV RBC AUTO: 87 FL (ref 80–100)
MONOCYTES # BLD AUTO: 0.35 X10*3/UL (ref 0.1–1)
MONOCYTES NFR BLD AUTO: 9.6 %
NEUTROPHILS # BLD AUTO: 2.07 X10*3/UL (ref 1.2–7.7)
NEUTROPHILS NFR BLD AUTO: 56.7 %
NITRITE UR QL STRIP.AUTO: NEGATIVE
NRBC BLD-RTO: 0 /100 WBCS (ref 0–0)
PH UR STRIP.AUTO: 6 [PH]
PLATELET # BLD AUTO: 182 X10*3/UL (ref 150–450)
POTASSIUM SERPL-SCNC: 4.3 MMOL/L (ref 3.5–5.3)
PROT SERPL-MCNC: 7.2 G/DL (ref 6.4–8.2)
PROT UR STRIP.AUTO-MCNC: NEGATIVE MG/DL
RBC # BLD AUTO: 4.79 X10*6/UL (ref 4–5.2)
RBC # UR STRIP.AUTO: NEGATIVE /UL
SODIUM SERPL-SCNC: 138 MMOL/L (ref 136–145)
SP GR UR STRIP.AUTO: 1.01
UROBILINOGEN UR STRIP.AUTO-MCNC: NORMAL MG/DL
WBC # BLD AUTO: 3.7 X10*3/UL (ref 4.4–11.3)

## 2025-02-01 PROCEDURE — 99284 EMERGENCY DEPT VISIT MOD MDM: CPT | Performed by: EMERGENCY MEDICINE

## 2025-02-01 PROCEDURE — 2500000001 HC RX 250 WO HCPCS SELF ADMINISTERED DRUGS (ALT 637 FOR MEDICARE OP)

## 2025-02-01 PROCEDURE — 36415 COLL VENOUS BLD VENIPUNCTURE: CPT | Performed by: EMERGENCY MEDICINE

## 2025-02-01 PROCEDURE — 85025 COMPLETE CBC W/AUTO DIFF WBC: CPT | Performed by: EMERGENCY MEDICINE

## 2025-02-01 PROCEDURE — 99283 EMERGENCY DEPT VISIT LOW MDM: CPT | Performed by: EMERGENCY MEDICINE

## 2025-02-01 PROCEDURE — 80053 COMPREHEN METABOLIC PANEL: CPT | Performed by: EMERGENCY MEDICINE

## 2025-02-01 PROCEDURE — 81003 URINALYSIS AUTO W/O SCOPE: CPT | Performed by: EMERGENCY MEDICINE

## 2025-02-01 PROCEDURE — 2500000004 HC RX 250 GENERAL PHARMACY W/ HCPCS (ALT 636 FOR OP/ED)

## 2025-02-01 PROCEDURE — 83690 ASSAY OF LIPASE: CPT | Performed by: EMERGENCY MEDICINE

## 2025-02-01 PROCEDURE — 2500000005 HC RX 250 GENERAL PHARMACY W/O HCPCS

## 2025-02-01 RX ORDER — ALUMINUM HYDROXIDE, MAGNESIUM HYDROXIDE, AND SIMETHICONE 1200; 120; 1200 MG/30ML; MG/30ML; MG/30ML
15 SUSPENSION ORAL EVERY 6 HOURS PRN
Qty: 355 ML | Refills: 0 | Status: SHIPPED | OUTPATIENT
Start: 2025-02-01 | End: 2025-02-11

## 2025-02-01 RX ORDER — ONDANSETRON 4 MG/1
4 TABLET, ORALLY DISINTEGRATING ORAL EVERY 8 HOURS PRN
Qty: 15 TABLET | Refills: 0 | Status: SHIPPED | OUTPATIENT
Start: 2025-02-01

## 2025-02-01 RX ORDER — ALUMINUM HYDROXIDE, MAGNESIUM HYDROXIDE, AND SIMETHICONE 1200; 120; 1200 MG/30ML; MG/30ML; MG/30ML
30 SUSPENSION ORAL ONCE
Status: COMPLETED | OUTPATIENT
Start: 2025-02-01 | End: 2025-02-01

## 2025-02-01 RX ORDER — DICYCLOMINE HYDROCHLORIDE 20 MG/1
20 TABLET ORAL 3 TIMES DAILY PRN
Qty: 20 TABLET | Refills: 0 | Status: SHIPPED | OUTPATIENT
Start: 2025-02-01

## 2025-02-01 RX ORDER — DICYCLOMINE HYDROCHLORIDE 10 MG/1
20 CAPSULE ORAL ONCE
Status: COMPLETED | OUTPATIENT
Start: 2025-02-01 | End: 2025-02-01

## 2025-02-01 RX ORDER — LIDOCAINE HYDROCHLORIDE 20 MG/ML
15 SOLUTION OROPHARYNGEAL ONCE
Status: COMPLETED | OUTPATIENT
Start: 2025-02-01 | End: 2025-02-01

## 2025-02-01 RX ORDER — ONDANSETRON 4 MG/1
4 TABLET, ORALLY DISINTEGRATING ORAL ONCE
Status: COMPLETED | OUTPATIENT
Start: 2025-02-01 | End: 2025-02-01

## 2025-02-01 RX ADMIN — ONDANSETRON 4 MG: 4 TABLET, ORALLY DISINTEGRATING ORAL at 09:50

## 2025-02-01 RX ADMIN — DICYCLOMINE HYDROCHLORIDE 20 MG: 10 CAPSULE ORAL at 09:50

## 2025-02-01 RX ADMIN — LIDOCAINE HYDROCHLORIDE 15 ML: 20 SOLUTION ORAL at 09:50

## 2025-02-01 RX ADMIN — ALUMINUM HYDROXIDE, MAGNESIUM HYDROXIDE, AND SIMETHICONE 30 ML: 1200; 120; 1200 SUSPENSION ORAL at 09:50

## 2025-02-01 ASSESSMENT — PAIN DESCRIPTION - PAIN TYPE: TYPE: ACUTE PAIN

## 2025-02-01 ASSESSMENT — PAIN - FUNCTIONAL ASSESSMENT: PAIN_FUNCTIONAL_ASSESSMENT: 0-10

## 2025-02-01 ASSESSMENT — LIFESTYLE VARIABLES
TOTAL SCORE: 0
EVER HAD A DRINK FIRST THING IN THE MORNING TO STEADY YOUR NERVES TO GET RID OF A HANGOVER: NO
EVER FELT BAD OR GUILTY ABOUT YOUR DRINKING: NO
HAVE YOU EVER FELT YOU SHOULD CUT DOWN ON YOUR DRINKING: NO
HAVE PEOPLE ANNOYED YOU BY CRITICIZING YOUR DRINKING: NO

## 2025-02-01 ASSESSMENT — PAIN DESCRIPTION - LOCATION: LOCATION: ABDOMEN

## 2025-02-01 ASSESSMENT — PAIN SCALES - GENERAL
PAINLEVEL_OUTOF10: 2
PAINLEVEL_OUTOF10: 6

## 2025-02-01 NOTE — DISCHARGE INSTRUCTIONS
Follow-up with your primary care physician.  Please take your medications as prescribed.  If you develop worsening abdominal pain, unstoppable vomiting, or other worrisome symptoms, return to the ER.

## 2025-02-01 NOTE — ED PROVIDER NOTES
EMERGENCY DEPARTMENT ENCOUNTER      Pt Name: Aleta Varela  MRN: [id number]  Birthdate 1965  Date of evaluation: 2/1/2025  Provider: Scar Deal MD    CHIEF COMPLAINT       Chief Complaint   Patient presents with    Abdominal Pain     Pt recently diagnosed with sinus infection. Pt was placed on augmentin and had an allergic reaction. Pt switched to dicyclomine and now pt is having general abdominal. Pt reports diarrhea.          HISTORY OF PRESENT ILLNESS    HPI  Patient is a 59-year-old female with a history of hyperlipidemia presenting with nausea and diarrhea.  This has been ongoing since she was started on Augmentin several days ago for a sinus infection.  She reports several episodes of nonbloody nonbilious emesis, nonbloody diarrhea.  She had hives on her hands which have since resolved.  Patient's PCP switched her to doxycycline.  She otherwise denies fever, sweats, chills, chest pain, shortness of breath, abdominal pain, dark or bloody stools, dysuria, hematuria.    Nursing Notes were reviewed.    PAST MEDICAL HISTORY     Past Medical History:   Diagnosis Date    Allergic rhinitis, unspecified 11/13/2020    Allergic rhinitis    Personal history of other specified conditions     History of prolonged Q-T interval on ECG         SURGICAL HISTORY       Past Surgical History:   Procedure Laterality Date    OTHER SURGICAL HISTORY  04/17/2014    Ovarian Cystectomy         CURRENT MEDICATIONS       Previous Medications    ALBUTEROL (PROAIR HFA) 90 MCG/ACTUATION INHALER    Inhale 2 puffs every 4 hours if needed for wheezing or shortness of breath.    ASCORBIC ACID (VITAMIN C ORAL)    Take by mouth.    ASPIRIN 81 MG EC TABLET    Take 1 tablet (81 mg) by mouth once daily.    ATORVASTATIN (LIPITOR) 40 MG TABLET    Take 1 tablet (40 mg) by mouth once daily.    DIPHENHYDRAMINE (BENADRYL ALLERGY) 25 MG TABLET    Take 1 tablet (25 mg) by mouth every 6 hours if needed for sleep (itching/allergic reaction).     DOXYCYCLINE (VIBRAMYCIN) 100 MG CAPSULE    Take 1 capsule (100 mg) by mouth 2 times a day for 7 days. Take with at least 8 ounces (large glass) of water, do not lie down for 30 minutes after    FLUTICASONE (FLONASE) 50 MCG/ACTUATION NASAL SPRAY    Administer 1 spray into each nostril once daily. Shake gently. Before first use, prime pump. After use, clean tip and replace cap.     MG TABLET    TAKE 1 TABLET (800 MG) BY MOUTH EVERY 8 HOURS IF NEEDED FOR MODERATE PAIN (4-6).       ALLERGIES     Augmentin [amoxicillin-pot clavulanate]    FAMILY HISTORY       Family History   Problem Relation Name Age of Onset    Other (cardiac disorder) Mother      Lung cancer Father      Drug abuse Son      Cancer Other            SOCIAL HISTORY       Social History     Socioeconomic History    Marital status:    Occupational History     Employer: [employer]   Tobacco Use    Smoking status: Former     Current packs/day: 1.50     Average packs/day: 1.5 packs/day for 15.0 years (22.5 ttl pk-yrs)     Types: Cigarettes    Smokeless tobacco: Never    Tobacco comments:     I quit smoking 14 years ago.   Vaping Use    Vaping status: Never Used   Substance and Sexual Activity    Alcohol use: Yes     Comment: Moderate    Drug use: Never    Sexual activity: Yes       SCREENINGS                        PHYSICAL EXAM    (up to 7 for level 4, 8 or more for level 5)     ED Triage Vitals [02/01/25 0757]   Temperature Heart Rate Respirations BP   36.3 °C (97.3 °F) 77 15 153/73      Pulse Ox Temp Source Heart Rate Source Patient Position   98 % Temporal Monitor Sitting      BP Location FiO2 (%)     Right arm --       Physical Exam  Constitutional:       General: She is not in acute distress.     Appearance: She is not ill-appearing.   HENT:      Head: Normocephalic and atraumatic.      Mouth/Throat:      Mouth: Mucous membranes are moist.      Pharynx: Oropharynx is clear.   Eyes:      General: No scleral icterus.     Extraocular  Movements: Extraocular movements intact.   Cardiovascular:      Rate and Rhythm: Normal rate and regular rhythm.      Heart sounds: Normal heart sounds.   Pulmonary:      Effort: Pulmonary effort is normal. No respiratory distress.      Breath sounds: Normal breath sounds.   Abdominal:      General: There is no distension.      Palpations: Abdomen is soft.      Tenderness: There is no abdominal tenderness.   Skin:     General: Skin is warm and dry.      Capillary Refill: Capillary refill takes less than 2 seconds.   Neurological:      General: No focal deficit present.          DIAGNOSTIC RESULTS     LABS:  Labs Reviewed   CBC WITH AUTO DIFFERENTIAL - Abnormal       Result Value    WBC 3.7 (*)     nRBC 0.0      RBC 4.79      Hemoglobin 14.0      Hematocrit 41.6      MCV 87      MCH 29.2      MCHC 33.7      RDW 13.2      Platelets 182      Neutrophils % 56.7      Immature Granulocytes %, Automated 0.0      Lymphocytes % 27.4      Monocytes % 9.6      Eosinophils % 5.8      Basophils % 0.5      Neutrophils Absolute 2.07      Immature Granulocytes Absolute, Automated 0.00      Lymphocytes Absolute 1.00 (*)     Monocytes Absolute 0.35      Eosinophils Absolute 0.21      Basophils Absolute 0.02     COMPREHENSIVE METABOLIC PANEL - Abnormal    Glucose 116 (*)     Sodium 138      Potassium 4.3      Chloride 103      Bicarbonate 27      Anion Gap 12      Urea Nitrogen 7      Creatinine 0.63      eGFR >90      Calcium 9.2      Albumin 4.6      Alkaline Phosphatase 68      Total Protein 7.2      AST 21      Bilirubin, Total 0.6      ALT 37     LIPASE - Normal    Lipase 14      Narrative:     Venipuncture immediately after or during the administration of Metamizole may lead to falsely low results. Testing should be performed immediately prior to Metamizole dosing.   URINALYSIS WITH REFLEX CULTURE AND MICROSCOPIC - Normal    Color, Urine Yellow      Appearance, Urine Clear      Specific Gravity, Urine 1.015      pH, Urine 6.0       Protein, Urine NEGATIVE      Glucose, Urine Normal      Blood, Urine NEGATIVE      Ketones, Urine NEGATIVE      Bilirubin, Urine NEGATIVE      Urobilinogen, Urine Normal      Nitrite, Urine NEGATIVE      Leukocyte Esterase, Urine NEGATIVE     URINALYSIS WITH REFLEX CULTURE AND MICROSCOPIC    Narrative:     The following orders were created for panel order Urinalysis with Reflex Culture and Microscopic.  Procedure                               Abnormality         Status                     ---------                               -----------         ------                     Urinalysis with Reflex C...[[id number]]  Normal              Final result               Extra Urine Gray Tube[[id number]]                            In process                   Please view results for these tests on the individual orders.   EXTRA URINE GRAY TUBE       All other labs were within normal range or not returned as of this dictation.    Imaging  No orders to display        Procedures  Procedures     EMERGENCY DEPARTMENT COURSE/MDM:     Diagnoses as of 02/01/25 0930   Epigastric pain   Nausea        Medical Decision Making  Presentation was consistent with allergy/intolerance of her antibiotics.  Basic labs were obtained today for any other concerning findings.  She has a mild leukocytosis of 3.7.  Urinalysis within normal limits.  Lipase negative.  CMP within normal limits.  Abdominal exam benign, no indication for CT at this time.  Patient given Zofran, Bentyl, GI cocktail with resolution of her symptoms.  She was discharged home in satisfactory condition with prescriptions for Zofran, Maalox.  All questions answered and return precautions discussed.    Patient and or family in agreement and understanding of treatment plan.  All questions answered.      I reviewed the case with the attending ED physician. The attending ED physician agrees with the plan. Patient and/or patient´s representative was counseled regarding labs, imaging,  likely diagnosis, and plan. All questions were answered.    ED Medications administered this visit:    Medications   dicyclomine (Bentyl) capsule 20 mg (has no administration in time range)   ondansetron ODT (Zofran-ODT) disintegrating tablet 4 mg (has no administration in time range)   alum-mag hydroxide-simeth (Mylanta) 200-200-20 mg/5 mL oral suspension 30 mL (has no administration in time range)   lidocaine (Xylocaine) 2 % mouth solution 15 mL (has no administration in time range)       New Prescriptions from this visit:    New Prescriptions    ALUM-MAG HYDROXIDE-SIMETH (MYLANTA) 200-200-20 MG/5 ML ORAL SUSPENSION    Take 15 mL by mouth every 6 hours if needed for indigestion or heartburn for up to 10 days.    DICYCLOMINE (BENTYL) 20 MG TABLET    Take 1 tablet (20 mg) by mouth 3 times a day as needed (abdominal cramping) for up to 20 doses.    ONDANSETRON ODT (ZOFRAN-ODT) 4 MG DISINTEGRATING TABLET    Dissolve 1 tablet (4 mg) in the mouth every 8 hours if needed for nausea or vomiting for up to 15 doses.       Follow-up:  Matti An, DO  [address]  [facility], [address]  [place] [id number] [phone]      As needed        Final Impression:   1. Epigastric pain    2. Nausea          (Please note that portions of this note were completed with a voice recognition program.  Efforts were made to edit the dictations but occasionally words are mis-transcribed.)     Scar Deal MD  Resident  02/01/25 5537

## 2025-02-05 ENCOUNTER — APPOINTMENT (OUTPATIENT)
Dept: RADIOLOGY | Facility: CLINIC | Age: 60
End: 2025-02-05
Payer: COMMERCIAL

## 2025-02-05 ENCOUNTER — APPOINTMENT (OUTPATIENT)
Dept: CARDIOLOGY | Facility: CLINIC | Age: 60
End: 2025-02-05
Payer: COMMERCIAL

## 2025-02-05 ENCOUNTER — OFFICE VISIT (OUTPATIENT)
Facility: CLINIC | Age: 60
End: 2025-02-05
Payer: COMMERCIAL

## 2025-02-05 VITALS
SYSTOLIC BLOOD PRESSURE: 128 MMHG | WEIGHT: 195.2 LBS | DIASTOLIC BLOOD PRESSURE: 76 MMHG | HEART RATE: 67 BPM | BODY MASS INDEX: 29.58 KG/M2 | RESPIRATION RATE: 18 BRPM | OXYGEN SATURATION: 97 % | HEIGHT: 68 IN

## 2025-02-05 DIAGNOSIS — R05.3 CHRONIC COUGH: ICD-10-CM

## 2025-02-05 DIAGNOSIS — Z77.090 ASBESTOS EXPOSURE: ICD-10-CM

## 2025-02-05 DIAGNOSIS — J30.89 ALLERGIC RHINITIS DUE TO OTHER ALLERGIC TRIGGER, UNSPECIFIED SEASONALITY: ICD-10-CM

## 2025-02-05 DIAGNOSIS — R91.8 ABNORMAL CT SCAN, LUNG: Primary | ICD-10-CM

## 2025-02-05 DIAGNOSIS — R06.02 SHORTNESS OF BREATH: ICD-10-CM

## 2025-02-05 DIAGNOSIS — Z77.120 MOLD EXPOSURE: ICD-10-CM

## 2025-02-05 PROCEDURE — 1036F TOBACCO NON-USER: CPT | Performed by: NURSE PRACTITIONER

## 2025-02-05 PROCEDURE — 99204 OFFICE O/P NEW MOD 45 MIN: CPT | Performed by: NURSE PRACTITIONER

## 2025-02-05 PROCEDURE — 3008F BODY MASS INDEX DOCD: CPT | Performed by: NURSE PRACTITIONER

## 2025-02-05 RX ORDER — ALBUTEROL SULFATE 0.83 MG/ML
3 SOLUTION RESPIRATORY (INHALATION) ONCE
OUTPATIENT
Start: 2025-02-05 | End: 2025-02-05

## 2025-02-05 RX ORDER — ALBUTEROL SULFATE 90 UG/1
1 INHALANT RESPIRATORY (INHALATION) ONCE
OUTPATIENT
Start: 2025-02-05

## 2025-02-05 ASSESSMENT — ENCOUNTER SYMPTOMS
SHORTNESS OF BREATH: 1
LOSS OF SENSATION IN FEET: 0
FATIGUE: 1
OCCASIONAL FEELINGS OF UNSTEADINESS: 0
DEPRESSION: 0
COUGH: 1
ABDOMINAL PAIN: 1

## 2025-02-05 ASSESSMENT — COLUMBIA-SUICIDE SEVERITY RATING SCALE - C-SSRS
6. HAVE YOU EVER DONE ANYTHING, STARTED TO DO ANYTHING, OR PREPARED TO DO ANYTHING TO END YOUR LIFE?: NO
1. IN THE PAST MONTH, HAVE YOU WISHED YOU WERE DEAD OR WISHED YOU COULD GO TO SLEEP AND NOT WAKE UP?: NO
2. HAVE YOU ACTUALLY HAD ANY THOUGHTS OF KILLING YOURSELF?: NO

## 2025-02-05 ASSESSMENT — PATIENT HEALTH QUESTIONNAIRE - PHQ9
2. FEELING DOWN, DEPRESSED OR HOPELESS: NOT AT ALL
1. LITTLE INTEREST OR PLEASURE IN DOING THINGS: NOT AT ALL
SUM OF ALL RESPONSES TO PHQ9 QUESTIONS 1 AND 2: 0

## 2025-02-05 NOTE — PATIENT INSTRUCTIONS
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       Return to clinic after 4-6 weeks and after PFTs, CT scan complete  or sooner if needed   Tisha Swanson CNP  My office number is [phone] -     Call to schedule  for radiology - CT scans/PFTs etc at  [phone]  General scheduling  [phone]     Best way to get a hold of me is to call my office --> Please do not send me follow my health messages  Any test results will be discussed at next visit -- please make sure to make a follow up appt after testing.

## 2025-02-17 ENCOUNTER — HOSPITAL ENCOUNTER (OUTPATIENT)
Dept: RADIOLOGY | Facility: CLINIC | Age: 60
Discharge: HOME | End: 2025-02-17
Payer: COMMERCIAL

## 2025-02-17 DIAGNOSIS — Z77.120 MOLD EXPOSURE: ICD-10-CM

## 2025-02-17 DIAGNOSIS — R05.3 CHRONIC COUGH: ICD-10-CM

## 2025-02-17 DIAGNOSIS — R91.8 ABNORMAL CT SCAN, LUNG: ICD-10-CM

## 2025-02-17 DIAGNOSIS — Z12.11 COLON CANCER SCREENING: Primary | ICD-10-CM

## 2025-02-17 DIAGNOSIS — R06.02 SHORTNESS OF BREATH: ICD-10-CM

## 2025-02-17 DIAGNOSIS — Z77.090 ASBESTOS EXPOSURE: ICD-10-CM

## 2025-02-17 PROCEDURE — 71250 CT THORAX DX C-: CPT

## 2025-02-17 PROCEDURE — 71250 CT THORAX DX C-: CPT | Performed by: RADIOLOGY

## 2025-02-17 RX ORDER — SODIUM, POTASSIUM,MAG SULFATES 17.5-3.13G
1 SOLUTION, RECONSTITUTED, ORAL ORAL 2 TIMES DAILY
Qty: 1 EACH | Refills: 0 | Status: SHIPPED | OUTPATIENT
Start: 2025-02-17 | End: 2025-02-18

## 2025-03-03 ENCOUNTER — ANESTHESIA EVENT (OUTPATIENT)
Dept: GASTROENTEROLOGY | Facility: EXTERNAL LOCATION | Age: 60
End: 2025-03-03

## 2025-03-06 ENCOUNTER — HOSPITAL ENCOUNTER (OUTPATIENT)
Dept: CARDIOLOGY | Facility: CLINIC | Age: 60
Discharge: HOME | End: 2025-03-06
Payer: COMMERCIAL

## 2025-03-06 VITALS
DIASTOLIC BLOOD PRESSURE: 76 MMHG | WEIGHT: 195 LBS | BODY MASS INDEX: 29.55 KG/M2 | HEIGHT: 68 IN | SYSTOLIC BLOOD PRESSURE: 132 MMHG

## 2025-03-06 DIAGNOSIS — E78.2 MIXED HYPERLIPIDEMIA: ICD-10-CM

## 2025-03-06 DIAGNOSIS — I45.10 INCOMPLETE RBBB: ICD-10-CM

## 2025-03-06 DIAGNOSIS — R94.31 ABNORMAL EKG: ICD-10-CM

## 2025-03-06 DIAGNOSIS — E78.00 ELEVATED LDL CHOLESTEROL LEVEL: ICD-10-CM

## 2025-03-06 DIAGNOSIS — Z82.79 FAMILY HISTORY OF CONGENITAL HEART DISEASE: ICD-10-CM

## 2025-03-06 DIAGNOSIS — R73.9 HYPERGLYCEMIA: ICD-10-CM

## 2025-03-06 DIAGNOSIS — I45.81 LONG Q-T SYNDROME: ICD-10-CM

## 2025-03-06 DIAGNOSIS — R93.1 ELEVATED CORONARY ARTERY CALCIUM SCORE: ICD-10-CM

## 2025-03-06 DIAGNOSIS — Z87.891 FORMER SMOKER: ICD-10-CM

## 2025-03-06 LAB
AORTIC VALVE MEAN GRADIENT: 5 MMHG
AORTIC VALVE PEAK VELOCITY: 1.52 M/S
AV PEAK GRADIENT: 9 MMHG
AVA (PEAK VEL): 2.18 CM2
AVA (VTI): 2.5 CM2
EJECTION FRACTION APICAL 4 CHAMBER: 66.3
EJECTION FRACTION: 65 %
LEFT ATRIUM VOLUME AREA LENGTH INDEX BSA: 17 ML/M2
LEFT VENTRICLE INTERNAL DIMENSION DIASTOLE: 5.54 CM (ref 3.5–6)
LEFT VENTRICULAR OUTFLOW TRACT DIAMETER: 1.99 CM
LV EJECTION FRACTION BIPLANE: 65 %
MITRAL VALVE E/A RATIO: 0.8
RIGHT VENTRICLE PEAK SYSTOLIC PRESSURE: 13.8 MMHG

## 2025-03-06 PROCEDURE — 93306 TTE W/DOPPLER COMPLETE: CPT

## 2025-03-06 PROCEDURE — 93306 TTE W/DOPPLER COMPLETE: CPT | Performed by: INTERNAL MEDICINE

## 2025-03-07 ENCOUNTER — HOSPITAL ENCOUNTER (OUTPATIENT)
Dept: CARDIOLOGY | Facility: CLINIC | Age: 60
End: 2025-03-07
Payer: COMMERCIAL

## 2025-03-07 ENCOUNTER — HOSPITAL ENCOUNTER (OUTPATIENT)
Dept: RADIOLOGY | Facility: CLINIC | Age: 60
End: 2025-03-07
Payer: COMMERCIAL

## 2025-03-07 ENCOUNTER — HOSPITAL ENCOUNTER (OUTPATIENT)
Dept: RADIOLOGY | Facility: CLINIC | Age: 60
Discharge: HOME | End: 2025-03-07
Payer: COMMERCIAL

## 2025-03-07 ENCOUNTER — APPOINTMENT (OUTPATIENT)
Dept: CARDIOLOGY | Facility: CLINIC | Age: 60
End: 2025-03-07
Payer: COMMERCIAL

## 2025-03-07 DIAGNOSIS — Z82.79 FAMILY HISTORY OF CONGENITAL HEART DISEASE: ICD-10-CM

## 2025-03-07 DIAGNOSIS — E78.00 ELEVATED LDL CHOLESTEROL LEVEL: ICD-10-CM

## 2025-03-07 DIAGNOSIS — I45.10 INCOMPLETE RBBB: ICD-10-CM

## 2025-03-07 DIAGNOSIS — R93.1 ELEVATED CORONARY ARTERY CALCIUM SCORE: ICD-10-CM

## 2025-03-07 DIAGNOSIS — Z87.891 FORMER SMOKER: ICD-10-CM

## 2025-03-07 DIAGNOSIS — E78.2 MIXED HYPERLIPIDEMIA: ICD-10-CM

## 2025-03-07 DIAGNOSIS — R73.9 HYPERGLYCEMIA: ICD-10-CM

## 2025-03-07 DIAGNOSIS — R94.31 ABNORMAL EKG: ICD-10-CM

## 2025-03-07 DIAGNOSIS — I45.81 LONG Q-T SYNDROME: ICD-10-CM

## 2025-03-10 ENCOUNTER — APPOINTMENT (OUTPATIENT)
Dept: CARDIOLOGY | Facility: CLINIC | Age: 60
End: 2025-03-10
Payer: COMMERCIAL

## 2025-03-19 ENCOUNTER — APPOINTMENT (OUTPATIENT)
Facility: CLINIC | Age: 60
End: 2025-03-19
Payer: COMMERCIAL

## 2025-03-19 ENCOUNTER — HOSPITAL ENCOUNTER (OUTPATIENT)
Dept: RESPIRATORY THERAPY | Facility: HOSPITAL | Age: 60
Discharge: HOME | End: 2025-03-19
Payer: COMMERCIAL

## 2025-03-19 ENCOUNTER — APPOINTMENT (OUTPATIENT)
Dept: RESPIRATORY THERAPY | Facility: HOSPITAL | Age: 60
End: 2025-03-19
Payer: COMMERCIAL

## 2025-03-19 ENCOUNTER — APPOINTMENT (OUTPATIENT)
Dept: CARDIOLOGY | Facility: CLINIC | Age: 60
End: 2025-03-19
Payer: COMMERCIAL

## 2025-03-19 DIAGNOSIS — R06.02 SHORTNESS OF BREATH: ICD-10-CM

## 2025-03-19 LAB
MGC ASCENT PFT - FEV1 - POST: 2.28
MGC ASCENT PFT - FEV1 - POST: 2.28
MGC ASCENT PFT - FEV1 - PRE: 2.13
MGC ASCENT PFT - FEV1 - PRE: 2.13
MGC ASCENT PFT - FEV1 - PREDICTED: 2.8
MGC ASCENT PFT - FEV1 - PREDICTED: 2.8
MGC ASCENT PFT - FVC - POST: 2.91
MGC ASCENT PFT - FVC - POST: 2.91
MGC ASCENT PFT - FVC - PRE: 2.95
MGC ASCENT PFT - FVC - PRE: 2.95
MGC ASCENT PFT - FVC - PREDICTED: 3.55
MGC ASCENT PFT - FVC - PREDICTED: 3.55

## 2025-03-19 PROCEDURE — 94060 EVALUATION OF WHEEZING: CPT

## 2025-03-20 ENCOUNTER — APPOINTMENT (OUTPATIENT)
Dept: CARDIOLOGY | Facility: CLINIC | Age: 60
End: 2025-03-20
Payer: COMMERCIAL

## 2025-03-20 ENCOUNTER — HOSPITAL ENCOUNTER (OUTPATIENT)
Dept: RADIOLOGY | Facility: CLINIC | Age: 60
Discharge: HOME | End: 2025-03-20
Payer: COMMERCIAL

## 2025-03-20 ENCOUNTER — HOSPITAL ENCOUNTER (OUTPATIENT)
Dept: CARDIOLOGY | Facility: CLINIC | Age: 60
Discharge: HOME | End: 2025-03-20
Payer: COMMERCIAL

## 2025-03-20 DIAGNOSIS — Z87.891 FORMER SMOKER: ICD-10-CM

## 2025-03-20 DIAGNOSIS — R93.1 ELEVATED CORONARY ARTERY CALCIUM SCORE: ICD-10-CM

## 2025-03-20 DIAGNOSIS — Z82.79 FAMILY HISTORY OF CONGENITAL HEART DISEASE: ICD-10-CM

## 2025-03-20 DIAGNOSIS — I45.81 LONG Q-T SYNDROME: ICD-10-CM

## 2025-03-20 DIAGNOSIS — R94.31 ABNORMAL EKG: Primary | ICD-10-CM

## 2025-03-20 PROCEDURE — 78452 HT MUSCLE IMAGE SPECT MULT: CPT

## 2025-03-20 PROCEDURE — 2500000004 HC RX 250 GENERAL PHARMACY W/ HCPCS (ALT 636 FOR OP/ED): Performed by: INTERNAL MEDICINE

## 2025-03-20 PROCEDURE — 3430000001 HC RX 343 DIAGNOSTIC RADIOPHARMACEUTICALS: Performed by: INTERNAL MEDICINE

## 2025-03-20 PROCEDURE — A9502 TC99M TETROFOSMIN: HCPCS | Performed by: INTERNAL MEDICINE

## 2025-03-20 PROCEDURE — 93017 CV STRESS TEST TRACING ONLY: CPT

## 2025-03-20 RX ORDER — REGADENOSON 0.08 MG/ML
0.4 INJECTION, SOLUTION INTRAVENOUS ONCE
Status: COMPLETED | OUTPATIENT
Start: 2025-03-20 | End: 2025-03-20

## 2025-03-20 RX ADMIN — TETROFOSMIN 12 MILLICURIE: 0.23 INJECTION, POWDER, LYOPHILIZED, FOR SOLUTION INTRAVENOUS at 07:45

## 2025-03-20 RX ADMIN — TETROFOSMIN 35.6 MILLICURIE: 0.23 INJECTION, POWDER, LYOPHILIZED, FOR SOLUTION INTRAVENOUS at 09:19

## 2025-03-20 RX ADMIN — REGADENOSON 0.4 MG: 0.08 INJECTION, SOLUTION INTRAVENOUS at 09:18

## 2025-03-31 ENCOUNTER — APPOINTMENT (OUTPATIENT)
Facility: CLINIC | Age: 60
End: 2025-03-31
Payer: COMMERCIAL

## 2025-03-31 DIAGNOSIS — M54.9 BACK PAIN, UNSPECIFIED BACK LOCATION, UNSPECIFIED BACK PAIN LATERALITY, UNSPECIFIED CHRONICITY: ICD-10-CM

## 2025-03-31 RX ORDER — IBUPROFEN 800 MG/1
800 TABLET ORAL EVERY 8 HOURS PRN
Qty: 90 TABLET | Refills: 0 | Status: SHIPPED | OUTPATIENT
Start: 2025-03-31

## 2025-04-18 ENCOUNTER — ANESTHESIA (OUTPATIENT)
Dept: GASTROENTEROLOGY | Facility: EXTERNAL LOCATION | Age: 60
End: 2025-04-18

## 2025-04-18 ENCOUNTER — APPOINTMENT (OUTPATIENT)
Dept: GASTROENTEROLOGY | Facility: EXTERNAL LOCATION | Age: 60
End: 2025-04-18
Payer: COMMERCIAL

## 2025-05-14 DIAGNOSIS — M54.9 BACK PAIN, UNSPECIFIED BACK LOCATION, UNSPECIFIED BACK PAIN LATERALITY, UNSPECIFIED CHRONICITY: ICD-10-CM

## 2025-05-14 RX ORDER — IBUPROFEN 800 MG/1
800 TABLET ORAL EVERY 8 HOURS PRN
Qty: 90 TABLET | Refills: 0 | Status: SHIPPED | OUTPATIENT
Start: 2025-05-14 | End: 2025-05-15

## 2025-05-15 DIAGNOSIS — M54.9 BACK PAIN, UNSPECIFIED BACK LOCATION, UNSPECIFIED BACK PAIN LATERALITY, UNSPECIFIED CHRONICITY: ICD-10-CM

## 2025-05-15 RX ORDER — IBUPROFEN 800 MG/1
800 TABLET ORAL 4 TIMES DAILY
Qty: 90 TABLET | Refills: 0 | Status: SHIPPED | OUTPATIENT
Start: 2025-05-15

## 2025-05-22 ENCOUNTER — APPOINTMENT (OUTPATIENT)
Dept: CARDIOLOGY | Facility: CLINIC | Age: 60
End: 2025-05-22
Payer: COMMERCIAL

## 2025-05-29 ENCOUNTER — APPOINTMENT (OUTPATIENT)
Dept: CARDIOLOGY | Facility: CLINIC | Age: 60
End: 2025-05-29
Payer: COMMERCIAL

## 2025-07-12 DIAGNOSIS — R19.7 DIARRHEA, UNSPECIFIED TYPE: Primary | ICD-10-CM

## 2025-07-18 LAB
C COLI+JEJUNI+LARI FUSA STL QL NAA+PROBE: NOT DETECTED
C DIFF TOX GENS STL QL NAA+PROBE: NOT DETECTED
CALPROTECTIN STL-MCNT: <5 MCG/G
CRYPTOSP AG STL QL IA: NORMAL
EC STX1 GENE STL QL NAA+PROBE: NOT DETECTED
EC STX2 GENE STL QL NAA+PROBE: NOT DETECTED
G LAMBLIA AG STL QL IA: NORMAL
NOROV GI+II ORF1-ORF2 JNC STL QL NAA+PR: NOT DETECTED
O+P STL CONC: NORMAL
O+P STL TRI STN: NORMAL
RVA NSP5 STL QL NAA+PROBE: NOT DETECTED
SALMONELLA SP RPOD STL QL NAA+PROBE: NOT DETECTED
SHIGELLA DNA SPEC QL NAA+PROBE: NOT DETECTED
V CHOL+PARA RFBL+TRKH+TNAA STL QL NAA+PR: NOT DETECTED
Y ENTERO RECN STL QL NAA+PROBE: NOT DETECTED

## 2025-07-28 LAB
C COLI+JEJUNI+LARI FUSA STL QL NAA+PROBE: NOT DETECTED
CRYPTOSP AG STL QL IA: NORMAL
EC STX1 GENE STL QL NAA+PROBE: NOT DETECTED
EC STX2 GENE STL QL NAA+PROBE: NOT DETECTED
G LAMBLIA AG STL QL IA: NORMAL
NOROV GI+II ORF1-ORF2 JNC STL QL NAA+PR: NOT DETECTED
O+P STL TRI STN: NORMAL
RVA NSP5 STL QL NAA+PROBE: NOT DETECTED
SALMONELLA SP RPOD STL QL NAA+PROBE: NOT DETECTED
SHIGELLA DNA SPEC QL NAA+PROBE: NOT DETECTED
V CHOL+PARA RFBL+TRKH+TNAA STL QL NAA+PR: NOT DETECTED
Y ENTERO RECN STL QL NAA+PROBE: NOT DETECTED